# Patient Record
Sex: FEMALE | Race: WHITE | NOT HISPANIC OR LATINO | Employment: FULL TIME | URBAN - METROPOLITAN AREA
[De-identification: names, ages, dates, MRNs, and addresses within clinical notes are randomized per-mention and may not be internally consistent; named-entity substitution may affect disease eponyms.]

---

## 2017-04-12 ENCOUNTER — LAB VISIT (OUTPATIENT)
Dept: LAB | Facility: HOSPITAL | Age: 57
End: 2017-04-12
Attending: FAMILY MEDICINE
Payer: COMMERCIAL

## 2017-04-12 ENCOUNTER — OFFICE VISIT (OUTPATIENT)
Dept: FAMILY MEDICINE | Facility: CLINIC | Age: 57
End: 2017-04-12
Payer: COMMERCIAL

## 2017-04-12 VITALS
TEMPERATURE: 98 F | BODY MASS INDEX: 29.13 KG/M2 | HEART RATE: 88 BPM | DIASTOLIC BLOOD PRESSURE: 78 MMHG | WEIGHT: 185.63 LBS | SYSTOLIC BLOOD PRESSURE: 124 MMHG | HEIGHT: 67 IN

## 2017-04-12 DIAGNOSIS — E11.69 COMBINED HYPERLIPIDEMIA ASSOCIATED WITH TYPE 2 DIABETES MELLITUS: ICD-10-CM

## 2017-04-12 DIAGNOSIS — I15.2 HYPERTENSION ASSOCIATED WITH DIABETES: ICD-10-CM

## 2017-04-12 DIAGNOSIS — E11.59 HYPERTENSION ASSOCIATED WITH DIABETES: ICD-10-CM

## 2017-04-12 DIAGNOSIS — Z00.00 ANNUAL PHYSICAL EXAM: Primary | ICD-10-CM

## 2017-04-12 DIAGNOSIS — E78.2 COMBINED HYPERLIPIDEMIA ASSOCIATED WITH TYPE 2 DIABETES MELLITUS: ICD-10-CM

## 2017-04-12 DIAGNOSIS — E11.9 CONTROLLED TYPE 2 DIABETES MELLITUS WITHOUT COMPLICATION, WITHOUT LONG-TERM CURRENT USE OF INSULIN: ICD-10-CM

## 2017-04-12 LAB
ALBUMIN SERPL BCP-MCNC: 4.1 G/DL
ALP SERPL-CCNC: 93 U/L
ALT SERPL W/O P-5'-P-CCNC: 86 U/L
ANION GAP SERPL CALC-SCNC: 10 MMOL/L
AST SERPL-CCNC: 70 U/L
BILIRUB SERPL-MCNC: 0.3 MG/DL
BUN SERPL-MCNC: 17 MG/DL
CALCIUM SERPL-MCNC: 9.5 MG/DL
CHLORIDE SERPL-SCNC: 100 MMOL/L
CHOLEST/HDLC SERPL: 3.7 {RATIO}
CO2 SERPL-SCNC: 25 MMOL/L
CREAT SERPL-MCNC: 0.9 MG/DL
EST. GFR  (AFRICAN AMERICAN): >60 ML/MIN/1.73 M^2
EST. GFR  (NON AFRICAN AMERICAN): >60 ML/MIN/1.73 M^2
GLUCOSE SERPL-MCNC: 114 MG/DL
HDL/CHOLESTEROL RATIO: 27.2 %
HDLC SERPL-MCNC: 169 MG/DL
HDLC SERPL-MCNC: 46 MG/DL
LDLC SERPL CALC-MCNC: 91.8 MG/DL
NONHDLC SERPL-MCNC: 123 MG/DL
POTASSIUM SERPL-SCNC: 3.9 MMOL/L
PROT SERPL-MCNC: 8 G/DL
SODIUM SERPL-SCNC: 135 MMOL/L
TRIGL SERPL-MCNC: 156 MG/DL

## 2017-04-12 PROCEDURE — 36415 COLL VENOUS BLD VENIPUNCTURE: CPT | Mod: PO

## 2017-04-12 PROCEDURE — 99999 PR PBB SHADOW E&M-EST. PATIENT-LVL III: CPT | Mod: PBBFAC,,, | Performed by: FAMILY MEDICINE

## 2017-04-12 PROCEDURE — 99396 PREV VISIT EST AGE 40-64: CPT | Mod: S$GLB,,, | Performed by: FAMILY MEDICINE

## 2017-04-12 PROCEDURE — 80053 COMPREHEN METABOLIC PANEL: CPT

## 2017-04-12 PROCEDURE — 3078F DIAST BP <80 MM HG: CPT | Mod: S$GLB,,, | Performed by: FAMILY MEDICINE

## 2017-04-12 PROCEDURE — 3074F SYST BP LT 130 MM HG: CPT | Mod: S$GLB,,, | Performed by: FAMILY MEDICINE

## 2017-04-12 PROCEDURE — 83036 HEMOGLOBIN GLYCOSYLATED A1C: CPT

## 2017-04-12 PROCEDURE — 88175 CYTOPATH C/V AUTO FLUID REDO: CPT

## 2017-04-12 PROCEDURE — 80061 LIPID PANEL: CPT

## 2017-04-12 RX ORDER — BENAZEPRIL HYDROCHLORIDE AND HYDROCHLOROTHIAZIDE 10; 12.5 MG/1; MG/1
1 TABLET ORAL DAILY
Qty: 90 TABLET | Refills: 3 | Status: SHIPPED | OUTPATIENT
Start: 2017-04-12 | End: 2018-04-14 | Stop reason: SDUPTHER

## 2017-04-12 RX ORDER — METFORMIN HYDROCHLORIDE 500 MG/1
500 TABLET, EXTENDED RELEASE ORAL 2 TIMES DAILY
Qty: 180 TABLET | Refills: 3 | Status: SHIPPED | OUTPATIENT
Start: 2017-04-12 | End: 2017-05-04 | Stop reason: DRUGHIGH

## 2017-04-12 RX ORDER — FLUTICASONE PROPIONATE 50 MCG
2 SPRAY, SUSPENSION (ML) NASAL DAILY
Qty: 16 G | Refills: 3 | Status: SHIPPED | OUTPATIENT
Start: 2017-04-12 | End: 2018-05-22 | Stop reason: SDUPTHER

## 2017-04-12 RX ORDER — LOVASTATIN 20 MG/1
20 TABLET ORAL NIGHTLY
Qty: 90 TABLET | Refills: 3 | Status: SHIPPED | OUTPATIENT
Start: 2017-04-12 | End: 2017-08-16 | Stop reason: CLARIF

## 2017-04-12 NOTE — MR AVS SNAPSHOT
[facility]  [address] [facility]  [place] [address]  Phone: [phone]  Fax: [phone]                  Marlene Carrillo   2017 10:40 AM   Office Visit    Description:  Female : 1960   Provider:  Leonardo Pham MD   Department:  [facility]           Reason for Visit     Annual Exam     Plantar Fasciitis     leg cramps           Diagnoses this Visit        Comments    Annual physical exam    -  Primary     Hypertension associated with diabetes         Controlled type 2 diabetes mellitus without complication, without long-term current use of insulin         Combined hyperlipidemia associated with type 2 diabetes mellitus                To Do List           Future Appointments        Provider Department Dept Phone    2017 3:15 PM LABORATORY, [place] [facility] [phone]    2017 8:00 AM HMD[facility] DEXA1C [facility] [phone]      Goals (5 Years of Data)     None       These Medications        Disp Refills Start End    benazepril-hydrochlorthiazide (LOTENSIN HCT) 10-12.5 mg Tab 90 tablet 3 2017     Take 1 tablet by mouth once daily. - Oral    Pharmacy: [facility] - [place], [place] - [address] [facility] Ph #: [phone]       lovastatin (MEVACOR) 20 MG tablet 90 tablet 3 2017     Take 1 tablet (20 mg total) by mouth nightly. - Oral    Pharmacy: [facility] - [place], [place]  [address] [facility] Ph #: [phone]       metformin (GLUCOPHAGE-XR) 500 MG 24 hr tablet 180 tablet 3 2017     Take 1 tablet (500 mg total) by mouth 2 (two) times daily. - Oral    Pharmacy: [facility] - [place], [place]  [address] [facility] [address] Ph #: [phone]       fluticasone (FLONASE) 50 mcg/actuation nasal spray 16 g 3 2017     2 sprays by Each Nare route once daily. - Each Nare    Pharmacy: [facility] - [place], [place]  [address] [facility] [address] Ph #: [phone]         [facility]      [facility] Care [facility] - 24/7 Assistance  Unless otherwise directed by your provider, please contact [facility], our nurse care line that is available for 24/7 assistance.     Registered nurses in the [facility] provide: appointment scheduling, clinical advisement, health education, and other advisory services.  Call: [phone] (toll free)               Medications           Message regarding Medications     Verify the changes and/or additions to your medication regime listed below are the same as discussed with your clinician today.  If any of these changes or additions are incorrect, please notify your healthcare provider.        STOP taking these medications     albuterol 90 mcg/actuation inhaler Inhale 2 puffs into the lungs every 6 (six) hours as needed for Wheezing or Shortness of Breath.           Verify that the below list of medications is an accurate representation of the medications you are currently taking.  If none reported, the list may be blank. If incorrect, please contact your healthcare provider. Carry this list with you in case of emergency.           Current Medications     benazepril-hydrochlorthiazide (LOTENSIN HCT) 10-12.5 mg Tab Take 1 tablet by mouth once daily.    BLOOD SUGAR DIAGNOSTIC (BLOOD GLUCOSE TEST MISC) No Sig Provided    cetirizine (ZYRTEC) 10 MG tablet Take 1 tablet (10 mg total) by mouth once daily.    famotidine (PEPCID) 10 MG tablet Take 1 tablet (10 mg total) by mouth 2 (two) times daily as needed for Heartburn.    fish oil-omega-3 fatty acids 300-1,000 mg capsule Take 2 g by mouth 2 (two) times daily.     fluticasone (FLONASE) 50 mcg/actuation nasal spray 2 sprays by Each Nare route once daily.    lovastatin (MEVACOR) 20 MG tablet Take 1 tablet (20 mg total) by mouth nightly.    metformin (GLUCOPHAGE-XR) 500 MG 24 hr tablet Take 1 tablet (500 mg total) by mouth 2 (two) times daily.    multivitamin capsule Take 1 capsule by mouth once daily.    "          Clinical Reference Information           Your Vitals Were     BP Pulse Temp Height Weight BMI    124/78 88 98.3 °F (36.8 °C) 5' 7" (1.702 m) 84.2 kg (185 lb 10 oz) 29.07 kg/m2      Blood Pressure          Most Recent Value    BP  124/78      Allergies as of 4/12/2017     Corgard  [Nadolol]    Fiorinal  [Butalbital-aspirin-caffeine]    Percodan [Oxycodone Hcl-oxycodone-asa]      Immunizations Administered on Date of Encounter - 4/12/2017     None      Orders Placed During Today's Visit      Normal Orders This Visit    Liquid-based pap smear, screening     Microalbumin/creatinine urine ratio     Microalbumin/creatinine urine ratio     Microalbumin/creatinine urine ratio     Future Labs/Procedures Expected by Expires    Mammo Digital Screening Bilat with CAD  4/12/2017 4/12/2018    Recurring Lab Work Interval Expires    Comprehensive metabolic panel  Every 16 weeks until 12/20/2030 12/20/2030    Hemoglobin A1c  Every 16 weeks until 12/20/2030 12/20/2030    Lipid panel  Every 16 weeks until 12/20/2030 12/20/2030    Microalbumin/creatinine urine ratio  Every 16 weeks until 12/20/2030 12/20/2030      Language Assistance Services     ATTENTION: Language assistance services are available, free of charge. Please call [phone].      ATENCIÓN: Si habmorgan yu, tiene a sullivan disposición servicios gratuitos de asistencia lingüística. Llame al [phone].     CHÚ Ý: N?u b?n nói Ti?ng Vi?t, có các d?ch v? h? tr? ngôn ng? mi?n phí dành cho b?n. G?i s? [phone].         [facility] complies with applicable Federal civil rights laws and does not discriminate on the basis of race, color, national origin, age, disability, or sex.        "

## 2017-04-12 NOTE — PROGRESS NOTES
Patient presents physical exam.  She needs her mammogram and Pap smear.  Diabetes.  Needs hemoglobin A1c  Blood pressure controlled.  Hyperlipidemia needs laboratory.    Past Medical History:  Past Medical History:   Diagnosis Date    Abnormal Pap smear     ALLERGIC RHINITIS     Diabetes mellitus     Diverticulosis of colon (without mention of hemorrhage)     GERD (gastroesophageal reflux disease)     Heart murmur     Hyperlipidemia LDL goal < 100     Hypertension     Insomnia     Moderate cervical dysplasia      Past Surgical History:   Procedure Laterality Date    BREAST BIOPSY      Cold knife conization      COLONOSCOPY  8/9/2012         INCONTINENCE SURGERY      TVT-O    Tonsillectomy       Social History     Social History    Marital status:      Spouse name: N/A    Number of children: N/A    Years of education: N/A     Occupational History    Not on file.     Social History Main Topics    Smoking status: Former Smoker     Quit date: 3/8/1995    Smokeless tobacco: Never Used    Alcohol use No    Drug use: No    Sexual activity: No     Other Topics Concern    Not on file     Social History Narrative    No narrative on file     Family History   Problem Relation Age of Onset    Cancer Mother     Diabetes Mother     Hypertension Mother     Depression Brother     Diabetes Brother     Hyperlipidemia Brother     Hypertension Brother     Diabetes Maternal Grandmother     Stroke Maternal Grandmother     Heart disease Maternal Grandfather     Stroke Paternal Grandmother     Breast cancer Maternal Aunt     Ovarian cancer Neg Hx      Review of patient's allergies indicates:   Allergen Reactions    Corgard  [nadolol] Other (See Comments)    Fiorinal  [butalbital-aspirin-caffeine] Other (See Comments)    Percodan [oxycodone hcl-oxycodone-asa] Other (See Comments)     Current Outpatient Prescriptions on File Prior to Visit   Medication Sig Dispense Refill    BLOOD SUGAR  DIAGNOSTIC (BLOOD GLUCOSE TEST MISC) No Sig Provided      cetirizine (ZYRTEC) 10 MG tablet Take 1 tablet (10 mg total) by mouth once daily. 30 tablet 11    famotidine (PEPCID) 10 MG tablet Take 1 tablet (10 mg total) by mouth 2 (two) times daily as needed for Heartburn. 60 tablet 11    fish oil-omega-3 fatty acids 300-1,000 mg capsule Take 2 g by mouth 2 (two) times daily.       multivitamin capsule Take 1 capsule by mouth once daily.        [DISCONTINUED] benazepril-hydrochlorthiazide (LOTENSIN HCT) 10-12.5 mg Tab Take 1 tablet by mouth once daily. 90 tablet 3    [DISCONTINUED] fluticasone (FLONASE) 50 mcg/actuation nasal spray 2 sprays by Each Nare route once daily. 16 g 0    [DISCONTINUED] lovastatin (MEVACOR) 20 MG tablet Take 1 tablet (20 mg total) by mouth nightly. 90 tablet 3    [DISCONTINUED] metformin (GLUCOPHAGE-XR) 500 MG 24 hr tablet Take 1 tablet (500 mg total) by mouth 2 (two) times daily. 180 tablet 3    [DISCONTINUED] albuterol 90 mcg/actuation inhaler Inhale 2 puffs into the lungs every 6 (six) hours as needed for Wheezing or Shortness of Breath. 18 g 0     No current facility-administered medications on file prior to visit.            ROS:  GENERAL: No fever, chills,  or significant weight changes.  HEENT: No headache or hearing complaints.  No dysphagia  Eyes: No vision complaints  CHEST: Denies MENA, cyanosis, wheezing, cough and sputum production.  CARDIOVASCULAR: Denies chest pain, PND, orthopnea or reduced exercise tolerance.  ABDOMEN: Appetite fine. Denies diarrhea, abdominal pain, hematemesis or blood in stool.  URINARY: No flank pain, dysuria or hematuria.  MUSCULOSKELETAL: No warmth swelling or tenderness of the joints  NEUROLOGIC: No focal weakness numbness or paresthesia  PSYCHIATRIC: Denies depression          Past Medical History:  Past Medical History:   Diagnosis Date    Abnormal Pap smear     ALLERGIC RHINITIS     Diabetes mellitus     Diverticulosis of colon (without  mention of hemorrhage)     GERD (gastroesophageal reflux disease)     Heart murmur     Hyperlipidemia LDL goal < 100     Hypertension     Insomnia     Moderate cervical dysplasia      Past Surgical History:   Procedure Laterality Date    BREAST BIOPSY      Cold knife conization      COLONOSCOPY  8/9/2012         INCONTINENCE SURGERY      TVT-O    Tonsillectomy       Social History     Social History    Marital status:      Spouse name: N/A    Number of children: N/A    Years of education: N/A     Occupational History    Not on file.     Social History Main Topics    Smoking status: Former Smoker     Quit date: 3/8/1995    Smokeless tobacco: Never Used    Alcohol use No    Drug use: No    Sexual activity: No     Other Topics Concern    Not on file     Social History Narrative    No narrative on file     Family History   Problem Relation Age of Onset    Cancer Mother     Diabetes Mother     Hypertension Mother     Depression Brother     Diabetes Brother     Hyperlipidemia Brother     Hypertension Brother     Diabetes Maternal Grandmother     Stroke Maternal Grandmother     Heart disease Maternal Grandfather     Stroke Paternal Grandmother     Breast cancer Maternal Aunt     Ovarian cancer Neg Hx      Review of patient's allergies indicates:   Allergen Reactions    Corgard  [nadolol] Other (See Comments)    Fiorinal  [butalbital-aspirin-caffeine] Other (See Comments)    Percodan [oxycodone hcl-oxycodone-asa] Other (See Comments)     Current Outpatient Prescriptions on File Prior to Visit   Medication Sig Dispense Refill    BLOOD SUGAR DIAGNOSTIC (BLOOD GLUCOSE TEST MISC) No Sig Provided      cetirizine (ZYRTEC) 10 MG tablet Take 1 tablet (10 mg total) by mouth once daily. 30 tablet 11    famotidine (PEPCID) 10 MG tablet Take 1 tablet (10 mg total) by mouth 2 (two) times daily as needed for Heartburn. 60 tablet 11    fish oil-omega-3 fatty acids 300-1,000 mg capsule Take  "2 g by mouth 2 (two) times daily.       multivitamin capsule Take 1 capsule by mouth once daily.        [DISCONTINUED] benazepril-hydrochlorthiazide (LOTENSIN HCT) 10-12.5 mg Tab Take 1 tablet by mouth once daily. 90 tablet 3    [DISCONTINUED] fluticasone (FLONASE) 50 mcg/actuation nasal spray 2 sprays by Each Nare route once daily. 16 g 0    [DISCONTINUED] lovastatin (MEVACOR) 20 MG tablet Take 1 tablet (20 mg total) by mouth nightly. 90 tablet 3    [DISCONTINUED] metformin (GLUCOPHAGE-XR) 500 MG 24 hr tablet Take 1 tablet (500 mg total) by mouth 2 (two) times daily. 180 tablet 3    [DISCONTINUED] albuterol 90 mcg/actuation inhaler Inhale 2 puffs into the lungs every 6 (six) hours as needed for Wheezing or Shortness of Breath. 18 g 0     No current facility-administered medications on file prior to visit.          ROS:  GENERAL: No fever, chills,  or significant weight changes.  HEENT: No headache, vision or hearing complaints.  No dysphagia  CHEST: Denies MENA, cyanosis, wheezing, cough and sputum production.  CARDIOVASCULAR: Denies chest pain, PND, orthopnea or reduced exercise tolerance.  ABDOMEN: Appetite fine. Denies diarrhea, abdominal pain, hematemesis or blood in stool.  URINARY: No flank pain, dysuria or hematuria.  MUSCULOSKELETAL: No warmth swelling or tenderness of the joints  NEUROLOGIC: No focal weakness numbness or paresthesia  PSYCHIATRIC: Denies depression    OBJECTIVE:   Vitals:    04/12/17 1052   BP: 124/78   Pulse: 88   Temp: 98.3 °F (36.8 °C)   Weight: 84.2 kg (185 lb 10 oz)   Height: 5' 7" (1.702 m)       Wt Readings from Last 3 Encounters:   04/12/17 84.2 kg (185 lb 10 oz)   05/19/16 81.7 kg (180 lb 1.9 oz)   01/08/16 78.8 kg (173 lb 11.6 oz)         APPEARANCE: Well nourished, well developed, in no acute distress.   HEAD: Normocephalic. Atraumatic. No sinus tenderness.   EYES:   Right eye: Pupil reactive. Conjunctiva clear.   Left eye: Pupil reactive. Conjunctiva clear.   Both fundi: " Grossly normal to nondilated exam. EOMI.   EARS: TM's intact. Light reflex normal. No retraction or perforation.   NOSE: clear.   MOUTH & THROAT: No pharyngeal erythema or exudate. No lesions.   NECK: No bruits. No JVD. No cervical lymphadenopathy. No thyromegaly.   CHEST: Breath sounds clear bilaterally. Normal respiratory effort   CARDIOVASCULAR: Normal rate. Regular rhythm. No murmurs. No rub. No gallops.   ABDOMEN: Bowel sounds normal. Soft. No tenderness. No organomegaly.   PERIPHERAL VASCULAR: No cyanosis. No clubbing. No edema.   NEUROLOGIC: No focal findings.   MENTAL STATUS: Alert. Oriented x 3.  Breast exam no masses or adenopathy  pelvic exam: normal external genitalia. Cervix is normal. Uterus not enlarged. No palpable adnexa. No lesions. No discharge.  Pap smear performed  Protective Sensation (w/ 10 gram monofilament):  Right: Intact  Left: Intact    Visual Inspection:  Normal -  Bilateral    Pedal Pulses:   Right: Present  Left: Present    Posterior tibialis:   Right:Present  Left: Present              Marlene was seen today for annual exam, plantar fasciitis and leg cramps.    Diagnoses and all orders for this visit:    Annual physical exam  -     Mammo Digital Screening Bilat with CAD; Future  -     Liquid-based pap smear, screening    Hypertension associated with diabetes  -     Comprehensive metabolic panel; Standing  -     Hemoglobin A1c; Standing  -     Microalbumin/creatinine urine ratio; Standing  -     Microalbumin/creatinine urine ratio  -     Microalbumin/creatinine urine ratio  -     Microalbumin/creatinine urine ratio    Controlled type 2 diabetes mellitus without complication, without long-term current use of insulin    Combined hyperlipidemia associated with type 2 diabetes mellitus  -     Lipid panel; Standing    Other orders  -     benazepril-hydrochlorthiazide (LOTENSIN HCT) 10-12.5 mg Tab; Take 1 tablet by mouth once daily.  -     lovastatin (MEVACOR) 20 MG tablet; Take 1 tablet (20  mg total) by mouth nightly.  -     metformin (GLUCOPHAGE-XR) 500 MG 24 hr tablet; Take 1 tablet (500 mg total) by mouth 2 (two) times daily.  -     fluticasone (FLONASE) 50 mcg/actuation nasal spray; 2 sprays by Each Nare route once daily.    Anticipatory guidance: Don't smoke.  Healthy diet and regular exercise recommended.

## 2017-04-13 ENCOUNTER — HOSPITAL ENCOUNTER (OUTPATIENT)
Dept: RADIOLOGY | Facility: HOSPITAL | Age: 57
Discharge: HOME | End: 2017-04-13
Attending: FAMILY MEDICINE
Payer: COMMERCIAL

## 2017-04-13 DIAGNOSIS — Z00.00 ANNUAL PHYSICAL EXAM: ICD-10-CM

## 2017-04-13 LAB
ESTIMATED AVG GLUCOSE: 171 MG/DL
HBA1C MFR BLD HPLC: 7.6 %

## 2017-04-13 PROCEDURE — 77067 SCR MAMMO BI INCL CAD: CPT | Mod: 26,,, | Performed by: RADIOLOGY

## 2017-04-13 PROCEDURE — 77067 SCR MAMMO BI INCL CAD: CPT | Mod: TC

## 2017-05-04 RX ORDER — METFORMIN HYDROCHLORIDE 500 MG/1
TABLET, EXTENDED RELEASE ORAL
Qty: 270 TABLET | Refills: 3 | Status: SHIPPED | OUTPATIENT
Start: 2017-05-04 | End: 2018-04-14 | Stop reason: SDUPTHER

## 2017-05-04 NOTE — TELEPHONE ENCOUNTER
----- Message from Leonardo Pham MD sent at 5/4/2017  1:26 PM CDT -----  Sugar borderline, otherwise lab work acceptable.  Recommend increase metformin  mg take 3 tablets daily.  Repeat hemoglobin A1c in 3 months. My nurse will contact you to arrange.  Thanks,  Dr. Pham    Results released on [facility]

## 2017-08-03 ENCOUNTER — LAB VISIT (OUTPATIENT)
Dept: LAB | Facility: HOSPITAL | Age: 57
End: 2017-08-03
Attending: FAMILY MEDICINE
Payer: COMMERCIAL

## 2017-08-03 DIAGNOSIS — E11.59 HYPERTENSION ASSOCIATED WITH DIABETES: ICD-10-CM

## 2017-08-03 DIAGNOSIS — I15.2 HYPERTENSION ASSOCIATED WITH DIABETES: ICD-10-CM

## 2017-08-03 DIAGNOSIS — E11.69 COMBINED HYPERLIPIDEMIA ASSOCIATED WITH TYPE 2 DIABETES MELLITUS: ICD-10-CM

## 2017-08-03 DIAGNOSIS — E78.2 COMBINED HYPERLIPIDEMIA ASSOCIATED WITH TYPE 2 DIABETES MELLITUS: ICD-10-CM

## 2017-08-03 LAB
ALBUMIN SERPL BCP-MCNC: 4 G/DL
ALP SERPL-CCNC: 79 U/L
ALT SERPL W/O P-5'-P-CCNC: 116 U/L
ANION GAP SERPL CALC-SCNC: 14 MMOL/L
AST SERPL-CCNC: 69 U/L
BILIRUB SERPL-MCNC: 0.3 MG/DL
BUN SERPL-MCNC: 17 MG/DL
CALCIUM SERPL-MCNC: 9.5 MG/DL
CHLORIDE SERPL-SCNC: 105 MMOL/L
CHOLEST/HDLC SERPL: 3.3 {RATIO}
CO2 SERPL-SCNC: 22 MMOL/L
CREAT SERPL-MCNC: 0.9 MG/DL
EST. GFR  (AFRICAN AMERICAN): >60 ML/MIN/1.73 M^2
EST. GFR  (NON AFRICAN AMERICAN): >60 ML/MIN/1.73 M^2
GLUCOSE SERPL-MCNC: 142 MG/DL
HDL/CHOLESTEROL RATIO: 30.1 %
HDLC SERPL-MCNC: 153 MG/DL
HDLC SERPL-MCNC: 46 MG/DL
LDLC SERPL CALC-MCNC: 83.6 MG/DL
NONHDLC SERPL-MCNC: 107 MG/DL
POTASSIUM SERPL-SCNC: 4.4 MMOL/L
PROT SERPL-MCNC: 7.6 G/DL
SODIUM SERPL-SCNC: 141 MMOL/L
TRIGL SERPL-MCNC: 117 MG/DL

## 2017-08-03 PROCEDURE — 36415 COLL VENOUS BLD VENIPUNCTURE: CPT | Mod: PO

## 2017-08-03 PROCEDURE — 80061 LIPID PANEL: CPT

## 2017-08-03 PROCEDURE — 83036 HEMOGLOBIN GLYCOSYLATED A1C: CPT

## 2017-08-03 PROCEDURE — 80053 COMPREHEN METABOLIC PANEL: CPT

## 2017-08-04 LAB
ESTIMATED AVG GLUCOSE: 151 MG/DL
HBA1C MFR BLD HPLC: 6.9 %

## 2017-08-16 DIAGNOSIS — E78.5 HYPERLIPIDEMIA, UNSPECIFIED HYPERLIPIDEMIA TYPE: Primary | ICD-10-CM

## 2017-08-16 RX ORDER — PRAVASTATIN SODIUM 20 MG/1
20 TABLET ORAL DAILY
Qty: 90 TABLET | Refills: 3 | Status: SHIPPED | OUTPATIENT
Start: 2017-08-16 | End: 2017-11-21

## 2017-08-16 NOTE — TELEPHONE ENCOUNTER
----- Message from Pretty Cherry sent at 8/16/2017 10:59 AM CDT -----  Pt was calling the nurse back...please call pt at [phone].

## 2017-08-16 NOTE — TELEPHONE ENCOUNTER
----- Message from Leonardo Pham MD sent at 8/16/2017 10:48 AM CDT -----  Sugar test okay.  Lipids okay.  Liver test slightly increased from previously.  Recommend discontinue lovastatin.  Start pravastatin 20 mg daily.  Repeat lipid panel, ALT 3 months.  Repeat hemoglobin A1c at the end of January. My nurse will contact you to arrange.  Thanks,  Dr. Pham    Results released on [facility]

## 2017-11-03 ENCOUNTER — LAB VISIT (OUTPATIENT)
Dept: LAB | Facility: HOSPITAL | Age: 57
End: 2017-11-03
Attending: FAMILY MEDICINE
Payer: COMMERCIAL

## 2017-11-03 DIAGNOSIS — E78.5 HYPERLIPIDEMIA, UNSPECIFIED HYPERLIPIDEMIA TYPE: ICD-10-CM

## 2017-11-03 DIAGNOSIS — E11.69 COMBINED HYPERLIPIDEMIA ASSOCIATED WITH TYPE 2 DIABETES MELLITUS: ICD-10-CM

## 2017-11-03 DIAGNOSIS — E78.2 COMBINED HYPERLIPIDEMIA ASSOCIATED WITH TYPE 2 DIABETES MELLITUS: ICD-10-CM

## 2017-11-03 LAB
ALT SERPL W/O P-5'-P-CCNC: 111 U/L
CHOLEST SERPL-MCNC: 162 MG/DL
CHOLEST/HDLC SERPL: 3.7 {RATIO}
HDLC SERPL-MCNC: 44 MG/DL
HDLC SERPL: 27.2 %
LDLC SERPL CALC-MCNC: 97.2 MG/DL
NONHDLC SERPL-MCNC: 118 MG/DL
TRIGL SERPL-MCNC: 104 MG/DL

## 2017-11-03 PROCEDURE — 80061 LIPID PANEL: CPT

## 2017-11-03 PROCEDURE — 36415 COLL VENOUS BLD VENIPUNCTURE: CPT | Mod: PO

## 2017-11-03 PROCEDURE — 84460 ALANINE AMINO (ALT) (SGPT): CPT

## 2017-11-21 ENCOUNTER — TELEPHONE (OUTPATIENT)
Dept: FAMILY MEDICINE | Facility: CLINIC | Age: 57
End: 2017-11-21

## 2017-11-21 DIAGNOSIS — R79.89 ELEVATED LIVER FUNCTION TESTS: Primary | ICD-10-CM

## 2017-11-21 NOTE — TELEPHONE ENCOUNTER
Notes Recorded by Leonardo Pham MD on 11/20/2017 at 8:30 AM CST  The cholesterol looks good, however the liver test is still elevated.  Recommend stop the pravastatin.  Repeat LFT with next scheduled laboratory in January. My nurse will contact you to arrange.  Thanks,  Dr. Pham

## 2018-01-24 ENCOUNTER — LAB VISIT (OUTPATIENT)
Dept: LAB | Facility: HOSPITAL | Age: 58
End: 2018-01-24
Attending: FAMILY MEDICINE
Payer: COMMERCIAL

## 2018-01-24 DIAGNOSIS — E11.59 HYPERTENSION ASSOCIATED WITH DIABETES: ICD-10-CM

## 2018-01-24 DIAGNOSIS — R79.89 ELEVATED LIVER FUNCTION TESTS: ICD-10-CM

## 2018-01-24 DIAGNOSIS — I15.2 HYPERTENSION ASSOCIATED WITH DIABETES: ICD-10-CM

## 2018-01-24 LAB
ALBUMIN SERPL BCP-MCNC: 3.7 G/DL
ALP SERPL-CCNC: 74 U/L
ALT SERPL W/O P-5'-P-CCNC: 83 U/L
AST SERPL-CCNC: 43 U/L
BILIRUB DIRECT SERPL-MCNC: 0.1 MG/DL
BILIRUB SERPL-MCNC: 0.3 MG/DL
ESTIMATED AVG GLUCOSE: 140 MG/DL
HBA1C MFR BLD HPLC: 6.5 %
PROT SERPL-MCNC: 7 G/DL

## 2018-01-24 PROCEDURE — 80076 HEPATIC FUNCTION PANEL: CPT

## 2018-01-24 PROCEDURE — 83036 HEMOGLOBIN GLYCOSYLATED A1C: CPT

## 2018-01-24 PROCEDURE — 36415 COLL VENOUS BLD VENIPUNCTURE: CPT | Mod: PO

## 2018-02-06 PROBLEM — Z78.9 STATIN INTOLERANCE: Status: ACTIVE | Noted: 2018-02-06

## 2018-04-12 ENCOUNTER — TELEPHONE (OUTPATIENT)
Dept: FAMILY MEDICINE | Facility: CLINIC | Age: 58
End: 2018-04-12

## 2018-04-12 DIAGNOSIS — Z12.39 SCREENING FOR BREAST CANCER: Primary | ICD-10-CM

## 2018-04-12 NOTE — TELEPHONE ENCOUNTER
----- Message from Cinthia Wild sent at 4/12/2018 11:49 AM CDT -----  Contact: pt  She's calling to schedule mammogram, please add orders and advise [phone]

## 2018-04-12 NOTE — TELEPHONE ENCOUNTER
----- Message from Cinthia Wild sent at 4/12/2018 11:49 AM CDT -----  Contact: pt  She's calling to schedule mammogram, please add orders and advise [phone]

## 2018-04-15 RX ORDER — BENAZEPRIL HYDROCHLORIDE AND HYDROCHLOROTHIAZIDE 10; 12.5 MG/1; MG/1
TABLET ORAL
Qty: 120 TABLET | Refills: 0 | Status: SHIPPED | OUTPATIENT
Start: 2018-04-15 | End: 2018-05-22 | Stop reason: SDUPTHER

## 2018-04-15 RX ORDER — METFORMIN HYDROCHLORIDE 500 MG/1
TABLET, EXTENDED RELEASE ORAL
Qty: 360 TABLET | Refills: 0 | Status: SHIPPED | OUTPATIENT
Start: 2018-04-15 | End: 2018-05-22 | Stop reason: SDUPTHER

## 2018-04-26 ENCOUNTER — HOSPITAL ENCOUNTER (OUTPATIENT)
Dept: RADIOLOGY | Facility: HOSPITAL | Age: 58
Discharge: HOME | End: 2018-04-26
Attending: FAMILY MEDICINE
Payer: COMMERCIAL

## 2018-04-26 VITALS — HEIGHT: 67 IN | WEIGHT: 185.63 LBS | BODY MASS INDEX: 29.13 KG/M2

## 2018-04-26 DIAGNOSIS — Z12.39 SCREENING FOR BREAST CANCER: ICD-10-CM

## 2018-04-26 PROCEDURE — 77063 BREAST TOMOSYNTHESIS BI: CPT | Mod: 26,,, | Performed by: RADIOLOGY

## 2018-04-26 PROCEDURE — 77067 SCR MAMMO BI INCL CAD: CPT | Mod: TC,PO

## 2018-04-26 PROCEDURE — 77067 SCR MAMMO BI INCL CAD: CPT | Mod: 26,,, | Performed by: RADIOLOGY

## 2018-05-04 ENCOUNTER — TELEPHONE (OUTPATIENT)
Dept: RADIOLOGY | Facility: HOSPITAL | Age: 58
End: 2018-05-04

## 2018-05-04 ENCOUNTER — HOSPITAL ENCOUNTER (OUTPATIENT)
Dept: RADIOLOGY | Facility: HOSPITAL | Age: 58
Discharge: HOME | End: 2018-05-04
Attending: FAMILY MEDICINE
Payer: COMMERCIAL

## 2018-05-04 DIAGNOSIS — R92.8 ABNORMAL MAMMOGRAM: ICD-10-CM

## 2018-05-04 PROCEDURE — 77065 DX MAMMO INCL CAD UNI: CPT | Mod: TC,PO,LT

## 2018-05-04 PROCEDURE — 77065 DX MAMMO INCL CAD UNI: CPT | Mod: 26,LT,, | Performed by: RADIOLOGY

## 2018-05-08 ENCOUNTER — PATIENT OUTREACH (OUTPATIENT)
Dept: ADMINISTRATIVE | Facility: HOSPITAL | Age: 58
End: 2018-05-08

## 2018-05-08 NOTE — LETTER
May 15, 2018    Marlene Carrillo  [address] [place] [address]           [facility]  [address]  [facility] [address]  Phone: [phone] Dear Mrs. Carrillo:    [facility] is committed to your overall health.  To help you get the most out of each of your visits, we will review your information to make sure you are up to date on all of your recommended tests and/or procedures.      Leonardo Pham MD has found that you may be due for   Health Maintenance Due   Topic    TETANUS VACCINE     Eye Exam     Foot Exam       If you have had any of the above done at another facility, please bring the records or information with you so that your record at [facility] will be complete.    If you are currently taking medication, please bring it with you to your appointment for review.    We will be happy to assist you with scheduling any necessary appointments or you may contact the [facility] appointment desk at [phone] to schedule at your convenience.   This information was sent to you via your patient portal.  Please check your message portal for important information.      Thank you for choosing [facility] for your healthcare needs,    If you have any questions or concerns, please don't hesitate to call.    Sincerely,    NIURKA Navarrete  Care Coordination Department  [facility]  [phone]

## 2018-05-22 ENCOUNTER — OFFICE VISIT (OUTPATIENT)
Dept: FAMILY MEDICINE | Facility: CLINIC | Age: 58
End: 2018-05-22
Payer: COMMERCIAL

## 2018-05-22 VITALS
HEIGHT: 66 IN | HEART RATE: 90 BPM | SYSTOLIC BLOOD PRESSURE: 129 MMHG | TEMPERATURE: 98 F | BODY MASS INDEX: 30.22 KG/M2 | WEIGHT: 188 LBS | DIASTOLIC BLOOD PRESSURE: 76 MMHG

## 2018-05-22 DIAGNOSIS — I15.2 HYPERTENSION ASSOCIATED WITH DIABETES: ICD-10-CM

## 2018-05-22 DIAGNOSIS — E11.9 DIABETES MELLITUS WITHOUT COMPLICATION: ICD-10-CM

## 2018-05-22 DIAGNOSIS — Z00.00 ROUTINE HISTORY AND PHYSICAL EXAMINATION OF ADULT: Primary | ICD-10-CM

## 2018-05-22 DIAGNOSIS — K21.9 GASTROESOPHAGEAL REFLUX DISEASE, ESOPHAGITIS PRESENCE NOT SPECIFIED: ICD-10-CM

## 2018-05-22 DIAGNOSIS — E78.2 COMBINED HYPERLIPIDEMIA ASSOCIATED WITH TYPE 2 DIABETES MELLITUS: ICD-10-CM

## 2018-05-22 DIAGNOSIS — Z78.9 STATIN INTOLERANCE: ICD-10-CM

## 2018-05-22 DIAGNOSIS — E11.69 COMBINED HYPERLIPIDEMIA ASSOCIATED WITH TYPE 2 DIABETES MELLITUS: ICD-10-CM

## 2018-05-22 DIAGNOSIS — J30.9 ALLERGIC RHINITIS, UNSPECIFIED SEASONALITY, UNSPECIFIED TRIGGER: ICD-10-CM

## 2018-05-22 DIAGNOSIS — E11.59 HYPERTENSION ASSOCIATED WITH DIABETES: ICD-10-CM

## 2018-05-22 PROCEDURE — 3044F HG A1C LEVEL LT 7.0%: CPT | Mod: CPTII,S$GLB,, | Performed by: FAMILY MEDICINE

## 2018-05-22 PROCEDURE — 90715 TDAP VACCINE 7 YRS/> IM: CPT | Mod: S$GLB,,, | Performed by: FAMILY MEDICINE

## 2018-05-22 PROCEDURE — 99999 PR PBB SHADOW E&M-EST. PATIENT-LVL III: CPT | Mod: PBBFAC,,, | Performed by: FAMILY MEDICINE

## 2018-05-22 PROCEDURE — 3078F DIAST BP <80 MM HG: CPT | Mod: CPTII,S$GLB,, | Performed by: FAMILY MEDICINE

## 2018-05-22 PROCEDURE — 90471 IMMUNIZATION ADMIN: CPT | Mod: S$GLB,,, | Performed by: FAMILY MEDICINE

## 2018-05-22 PROCEDURE — 3074F SYST BP LT 130 MM HG: CPT | Mod: CPTII,S$GLB,, | Performed by: FAMILY MEDICINE

## 2018-05-22 PROCEDURE — 99396 PREV VISIT EST AGE 40-64: CPT | Mod: 25,S$GLB,, | Performed by: FAMILY MEDICINE

## 2018-05-22 RX ORDER — FLUTICASONE PROPIONATE 50 MCG
2 SPRAY, SUSPENSION (ML) NASAL DAILY
Qty: 48 G | Refills: 3 | Status: SHIPPED | OUTPATIENT
Start: 2018-05-22

## 2018-05-22 RX ORDER — ASPIRIN 81 MG/1
81 TABLET ORAL DAILY
COMMUNITY
Start: 2018-05-22 | End: 2023-05-02

## 2018-05-22 RX ORDER — GLUCOSAMINE HCL 500 MG
1 TABLET ORAL DAILY
COMMUNITY

## 2018-05-22 RX ORDER — ASCORBIC ACID 1000 MG
250 TABLET ORAL DAILY
COMMUNITY
End: 2023-05-02

## 2018-05-22 RX ORDER — OMEPRAZOLE 20 MG/1
20 CAPSULE, DELAYED RELEASE ORAL EVERY OTHER DAY
COMMUNITY
End: 2020-12-17

## 2018-05-22 RX ORDER — METFORMIN HYDROCHLORIDE 500 MG/1
1500 TABLET, EXTENDED RELEASE ORAL EVERY MORNING
Qty: 270 TABLET | Refills: 3 | Status: SHIPPED | OUTPATIENT
Start: 2018-05-22 | End: 2018-07-27 | Stop reason: DRUGHIGH

## 2018-05-22 RX ORDER — BENAZEPRIL HYDROCHLORIDE AND HYDROCHLOROTHIAZIDE 10; 12.5 MG/1; MG/1
1 TABLET ORAL DAILY
Qty: 90 TABLET | Refills: 3 | Status: SHIPPED | OUTPATIENT
Start: 2018-05-22 | End: 2019-05-21

## 2018-05-22 NOTE — PROGRESS NOTES
Patient presents physical exam follow-up diabetes hypertension hyperlipidemia.  Statin intolerant.  Reflux well controlled using omeprazole every other day.  Eye exam up-to-date.  Mammogram up-to-date and was abnormal.  She is pending a breast biopsy.    Diabetes Management Status    Statin: Not taking  ACE/ARB: Taking    Screening or Prevention Patient's value Goal Complete/Controlled?   HgA1C Testing and Control   Lab Results   Component Value Date    HGBA1C 6.5 (H) 01/24/2018      Annually/Less than 8% Yes   Lipid profile : 11/03/2017 Annually Yes   LDL control Lab Results   Component Value Date    LDLCALC 97.2 11/03/2017    Annually/Less than 100 mg/dl  Yes   Nephropathy screening Lab Results   Component Value Date    LABMICR 19.0 08/03/2017     Lab Results   Component Value Date    PROTEINUA Negative 03/08/2012    Annually Yes   Blood pressure BP Readings from Last 1 Encounters:   05/22/18 129/76    Less than 140/90 Yes   Dilated retinal exam : 04/26/2018 Annually Yes   Foot exam   : 05/22/2018 Annually Yes         Past Medical History:  Past Medical History:   Diagnosis Date    Abnormal Pap smear     ALLERGIC RHINITIS     Diabetes mellitus     Diverticulosis of colon (without mention of hemorrhage)     GERD (gastroesophageal reflux disease)     Heart murmur     Hyperlipidemia LDL goal < 100     Hypertension     Insomnia     Moderate cervical dysplasia      Past Surgical History:   Procedure Laterality Date    BREAST BIOPSY      Cold knife conization      COLONOSCOPY  8/9/2012         INCONTINENCE SURGERY      TVT-O    Tonsillectomy       Social History     Social History    Marital status:      Spouse name: N/A    Number of children: N/A    Years of education: N/A     Occupational History    Not on file.     Social History Main Topics    Smoking status: Former Smoker     Quit date: 3/8/1995    Smokeless tobacco: Never Used    Alcohol use No    Drug use: No    Sexual activity: No      Other Topics Concern    Not on file     Social History Narrative    No narrative on file     Family History   Problem Relation Age of Onset    Cancer Mother     Diabetes Mother     Hypertension Mother     Depression Brother     Diabetes Brother     Hyperlipidemia Brother     Hypertension Brother     Diabetes Maternal Grandmother     Stroke Maternal Grandmother     Heart disease Maternal Grandfather     Stroke Paternal Grandmother     Breast cancer Maternal Aunt     Ovarian cancer Neg Hx      Review of patient's allergies indicates:   Allergen Reactions    Corgard  [nadolol] Other (See Comments)    Fiorinal  [butalbital-aspirin-caffeine] Other (See Comments)    Percodan [oxycodone hcl-oxycodone-asa] Other (See Comments)    Pravastatin Other (See Comments)     Elevated liver enzymes     Current Outpatient Prescriptions on File Prior to Visit   Medication Sig Dispense Refill    BLOOD SUGAR DIAGNOSTIC (BLOOD GLUCOSE TEST MISC) No Sig Provided      cetirizine (ZYRTEC) 10 MG tablet Take 1 tablet (10 mg total) by mouth once daily. 30 tablet 11    multivitamin capsule Take 1 capsule by mouth once daily.        [DISCONTINUED] benazepril-hydrochlorthiazide (LOTENSIN HCT) 10-12.5 mg Tab TAKE ONE TABLET BY MOUTH EVERY  tablet 0    [DISCONTINUED] fluticasone (FLONASE) 50 mcg/actuation nasal spray 2 sprays by Each Nare route once daily. 16 g 3    [DISCONTINUED] metFORMIN (GLUCOPHAGE-XR) 500 MG 24 hr tablet TAKE THREE TABLETS BY MOUTH EVERY MORNING 360 tablet 0    [DISCONTINUED] famotidine (PEPCID) 10 MG tablet Take 1 tablet (10 mg total) by mouth 2 (two) times daily as needed for Heartburn. 60 tablet 11    [DISCONTINUED] fish oil-omega-3 fatty acids 300-1,000 mg capsule Take 2 g by mouth 2 (two) times daily.        No current facility-administered medications on file prior to visit.      Answers for HPI/ROS submitted by the patient on 5/20/2018   activity change: No  unexpected weight change:  "No  neck pain: No  hearing loss: No  rhinorrhea: No  trouble swallowing: No  eye discharge: No  visual disturbance: No  chest tightness: No  wheezing: No  chest pain: No  palpitations: No  blood in stool: No  constipation: No  vomiting: No  diarrhea: No  polydipsia: No  polyuria: No  difficulty urinating: No  hematuria: No  menstrual problem: No  dysuria: No  joint swelling: No  arthralgias: No  headaches: No  weakness: No  confusion: No  dysphoric mood: No      OBJECTIVE:   Vitals:    05/22/18 0843   BP: 129/76   Pulse: 90   Temp: 98 °F (36.7 °C)   Weight: 85.3 kg (188 lb)   Height: 5' 6.2" (1.681 m)     Wt Readings from Last 3 Encounters:   05/22/18 85.3 kg (188 lb)   04/26/18 84.2 kg (185 lb 10 oz)   04/12/17 84.2 kg (185 lb 10 oz)       APPEARANCE: Well nourished, well developed, in no acute distress.   HEAD: Normocephalic. Atraumatic. No sinus tenderness.   EYES:   Right eye: Pupil reactive. Conjunctiva clear.   Left eye: Pupil reactive. Conjunctiva clear.   Both fundi: Grossly normal to nondilated exam. EOMI.   EARS: TM's intact. Light reflex normal. No retraction or perforation.   NOSE: clear.   MOUTH & THROAT: No pharyngeal erythema or exudate. No lesions.   NECK: No bruits. No JVD. No cervical lymphadenopathy. No thyromegaly.   CHEST: Breath sounds clear bilaterally. Normal respiratory effort   CARDIOVASCULAR: Normal rate. Regular rhythm. No murmurs. No rub. No gallops.   ABDOMEN: Bowel sounds normal. Soft. No tenderness. No organomegaly.   PERIPHERAL VASCULAR: No cyanosis. No clubbing. No edema.   NEUROLOGIC: No focal findings.    Feet:Sensation in the feet intact to monofilament testing.   No significant foot lesions.Pulses palpable.  MENTAL STATUS: Alert. Oriented x 3.     Marlene was seen today for annual exam.    Diagnoses and all orders for this visit:    Routine history and physical examination of adult    Diabetes mellitus without complication    Hypertension associated with diabetes    Combined " hyperlipidemia associated with type 2 diabetes mellitus    Statin intolerance    Allergic rhinitis, unspecified seasonality, unspecified trigger    Gastroesophageal reflux disease, esophagitis presence not specified    Other orders  -     Cancel: Diabetic Eye Screening Photo; Future  -     Tdap Vaccine; Future  -     fluticasone (FLONASE) 50 mcg/actuation nasal spray; 2 sprays (100 mcg total) by Each Nare route once daily.  -     benazepril-hydrochlorthiazide (LOTENSIN HCT) 10-12.5 mg Tab; Take 1 tablet by mouth once daily.  -     metFORMIN (GLUCOPHAGE-XR) 500 MG 24 hr tablet; Take 3 tablets (1,500 mg total) by mouth every morning.  -     Tdap Vaccine  -     aspirin (ECOTRIN) 81 MG EC tablet; Take 1 tablet (81 mg total) by mouth once daily.     Add aspirin daily starting after her breast biopsy.  Continue other medication as currently.    Anticipatory guidance: Don't smoke.  Healthy diet and regular exercise recommended.

## 2018-05-31 ENCOUNTER — HOSPITAL ENCOUNTER (OUTPATIENT)
Dept: RADIOLOGY | Facility: HOSPITAL | Age: 58
Discharge: HOME | End: 2018-05-31
Attending: FAMILY MEDICINE
Payer: COMMERCIAL

## 2018-05-31 DIAGNOSIS — R92.8 ABNORMAL MAMMOGRAM OF LEFT BREAST: ICD-10-CM

## 2018-05-31 PROCEDURE — 27201044 MAMMO BREAST STEREOTACTIC BREAST BIOPSY LEFT: Mod: PO

## 2018-05-31 PROCEDURE — 19081 BX BREAST 1ST LESION STRTCTC: CPT | Mod: TC,PO,LT

## 2018-05-31 PROCEDURE — 88305 TISSUE EXAM BY PATHOLOGIST: CPT | Mod: 26,,, | Performed by: PATHOLOGY

## 2018-05-31 PROCEDURE — 88305 TISSUE EXAM BY PATHOLOGIST: CPT | Performed by: PATHOLOGY

## 2018-05-31 PROCEDURE — 19081 BX BREAST 1ST LESION STRTCTC: CPT | Mod: LT,,, | Performed by: RADIOLOGY

## 2018-06-04 ENCOUNTER — TELEPHONE (OUTPATIENT)
Dept: RADIOLOGY | Facility: HOSPITAL | Age: 58
End: 2018-06-04

## 2018-06-04 NOTE — TELEPHONE ENCOUNTER
..Patient notified of breast biopsy results.    FINAL PATHOLOGIC DIAGNOSIS  CORE BIOPSIES OF UPPER CENTRAL LEFT BREAST:  DENSE STROMAL SCLEROSIS WITH DYSTROPHIC CALCIFICATION IN SPECIMEN 1  NO ATYPICAL DUCTAL PROLIFERATIVE ACTIVITY IDENTIFIED     Instructed to repeat diagnostic mammogram in 6  months

## 2018-07-11 ENCOUNTER — LAB VISIT (OUTPATIENT)
Dept: LAB | Facility: HOSPITAL | Age: 58
End: 2018-07-11
Attending: FAMILY MEDICINE
Payer: COMMERCIAL

## 2018-07-11 DIAGNOSIS — E78.2 COMBINED HYPERLIPIDEMIA ASSOCIATED WITH TYPE 2 DIABETES MELLITUS: ICD-10-CM

## 2018-07-11 DIAGNOSIS — E11.69 COMBINED HYPERLIPIDEMIA ASSOCIATED WITH TYPE 2 DIABETES MELLITUS: ICD-10-CM

## 2018-07-11 DIAGNOSIS — I15.2 HYPERTENSION ASSOCIATED WITH DIABETES: ICD-10-CM

## 2018-07-11 DIAGNOSIS — E11.59 HYPERTENSION ASSOCIATED WITH DIABETES: ICD-10-CM

## 2018-07-11 LAB
ALBUMIN SERPL BCP-MCNC: 3.8 G/DL
ALP SERPL-CCNC: 80 U/L
ALT SERPL W/O P-5'-P-CCNC: 72 U/L
ANION GAP SERPL CALC-SCNC: 10 MMOL/L
AST SERPL-CCNC: 39 U/L
BILIRUB SERPL-MCNC: 0.2 MG/DL
BUN SERPL-MCNC: 17 MG/DL
CALCIUM SERPL-MCNC: 9.3 MG/DL
CHLORIDE SERPL-SCNC: 105 MMOL/L
CHOLEST SERPL-MCNC: 207 MG/DL
CHOLEST/HDLC SERPL: 4.9 {RATIO}
CO2 SERPL-SCNC: 25 MMOL/L
CREAT SERPL-MCNC: 0.8 MG/DL
EST. GFR  (AFRICAN AMERICAN): >60 ML/MIN/1.73 M^2
EST. GFR  (NON AFRICAN AMERICAN): >60 ML/MIN/1.73 M^2
ESTIMATED AVG GLUCOSE: 157 MG/DL
GLUCOSE SERPL-MCNC: 127 MG/DL
HBA1C MFR BLD HPLC: 7.1 %
HDLC SERPL-MCNC: 42 MG/DL
HDLC SERPL: 20.3 %
LDLC SERPL CALC-MCNC: 124.4 MG/DL
NONHDLC SERPL-MCNC: 165 MG/DL
POTASSIUM SERPL-SCNC: 3.9 MMOL/L
PROT SERPL-MCNC: 7.1 G/DL
SODIUM SERPL-SCNC: 140 MMOL/L
TRIGL SERPL-MCNC: 203 MG/DL

## 2018-07-11 PROCEDURE — 36415 COLL VENOUS BLD VENIPUNCTURE: CPT | Mod: PO

## 2018-07-11 PROCEDURE — 83036 HEMOGLOBIN GLYCOSYLATED A1C: CPT

## 2018-07-11 PROCEDURE — 80061 LIPID PANEL: CPT

## 2018-07-11 PROCEDURE — 80053 COMPREHEN METABOLIC PANEL: CPT

## 2018-07-27 DIAGNOSIS — E11.9 TYPE 2 DIABETES MELLITUS WITHOUT COMPLICATION, WITHOUT LONG-TERM CURRENT USE OF INSULIN: Primary | ICD-10-CM

## 2018-07-27 RX ORDER — METFORMIN HYDROCHLORIDE 500 MG/1
2000 TABLET, EXTENDED RELEASE ORAL DAILY
Qty: 360 TABLET | Refills: 1 | Status: SHIPPED | OUTPATIENT
Start: 2018-07-27 | End: 2019-01-30

## 2018-07-27 RX ORDER — METFORMIN HYDROCHLORIDE 500 MG/1
500 TABLET, EXTENDED RELEASE ORAL 4 TIMES DAILY
COMMUNITY
End: 2018-07-27 | Stop reason: SDUPTHER

## 2018-07-27 NOTE — TELEPHONE ENCOUNTER
Message   Received: Today   Message Contents   MD JENIFER Breen. Staff             Sugar and cholesterol borderline.  Liver tests improving.  Recommend increase metformin  mg take 4 tablets every morning.  Continue other medication as currently.  Recheck hemoglobin A1c, LFT  beginning of January. My nurse will contact you to arrange.   Thanks,   Dr. Pham

## 2018-12-03 ENCOUNTER — HOSPITAL ENCOUNTER (OUTPATIENT)
Dept: RADIOLOGY | Facility: HOSPITAL | Age: 58
Discharge: HOME | End: 2018-12-03
Attending: FAMILY MEDICINE
Payer: COMMERCIAL

## 2018-12-03 VITALS — BODY MASS INDEX: 30.22 KG/M2 | HEIGHT: 66 IN | WEIGHT: 188 LBS

## 2018-12-03 DIAGNOSIS — R92.8 ABNORMAL MAMMOGRAM OF LEFT BREAST: ICD-10-CM

## 2018-12-03 DIAGNOSIS — R92.8 ABNORMAL MAMMOGRAM: ICD-10-CM

## 2018-12-03 PROCEDURE — 77065 DX MAMMO INCL CAD UNI: CPT | Mod: TC,PO,LT

## 2018-12-03 PROCEDURE — G0279 TOMOSYNTHESIS, MAMMO: HCPCS | Mod: 26,,, | Performed by: RADIOLOGY

## 2018-12-03 PROCEDURE — 77065 DX MAMMO INCL CAD UNI: CPT | Mod: 26,LT,, | Performed by: RADIOLOGY

## 2018-12-03 PROCEDURE — 77061 BREAST TOMOSYNTHESIS UNI: CPT | Mod: TC,PO,LT

## 2019-01-07 ENCOUNTER — LAB VISIT (OUTPATIENT)
Dept: LAB | Facility: HOSPITAL | Age: 59
End: 2019-01-07
Attending: FAMILY MEDICINE
Payer: COMMERCIAL

## 2019-01-07 DIAGNOSIS — E11.9 TYPE 2 DIABETES MELLITUS WITHOUT COMPLICATION, WITHOUT LONG-TERM CURRENT USE OF INSULIN: ICD-10-CM

## 2019-01-07 DIAGNOSIS — E11.59 HYPERTENSION ASSOCIATED WITH DIABETES: ICD-10-CM

## 2019-01-07 DIAGNOSIS — I15.2 HYPERTENSION ASSOCIATED WITH DIABETES: ICD-10-CM

## 2019-01-07 LAB
ALBUMIN SERPL BCP-MCNC: 4.1 G/DL
ALP SERPL-CCNC: 84 U/L
ALT SERPL W/O P-5'-P-CCNC: 84 U/L
AST SERPL-CCNC: 45 U/L
BILIRUB DIRECT SERPL-MCNC: 0.2 MG/DL
BILIRUB SERPL-MCNC: 0.3 MG/DL
ESTIMATED AVG GLUCOSE: 146 MG/DL
HBA1C MFR BLD HPLC: 6.7 %
PROT SERPL-MCNC: 7.7 G/DL

## 2019-01-07 PROCEDURE — 83036 HEMOGLOBIN GLYCOSYLATED A1C: CPT

## 2019-01-07 PROCEDURE — 80076 HEPATIC FUNCTION PANEL: CPT

## 2019-01-07 PROCEDURE — 36415 COLL VENOUS BLD VENIPUNCTURE: CPT | Mod: PO

## 2019-01-18 ENCOUNTER — TELEPHONE (OUTPATIENT)
Dept: FAMILY MEDICINE | Facility: CLINIC | Age: 59
End: 2019-01-18

## 2019-01-18 DIAGNOSIS — R79.89 ELEVATED LIVER FUNCTION TESTS: Primary | ICD-10-CM

## 2019-01-18 NOTE — TELEPHONE ENCOUNTER
MD JENIFER Breen Staff             Sugar looks good.  Liver tests still elevated.  Recommend get ultrasound abdomen, check iron, TIBC, ferritin, acute hepatitis panel now.  Schedule lipid panel, CMP, hemoglobin A1c,  urine microalbumin in beginning of July. My nurse will contact you to arrange.   Thanks,   Dr. Pham

## 2019-01-28 ENCOUNTER — HOSPITAL ENCOUNTER (OUTPATIENT)
Dept: RADIOLOGY | Facility: HOSPITAL | Age: 59
Discharge: HOME | End: 2019-01-28
Attending: FAMILY MEDICINE
Payer: COMMERCIAL

## 2019-01-28 ENCOUNTER — OFFICE VISIT (OUTPATIENT)
Dept: FAMILY MEDICINE | Facility: CLINIC | Age: 59
End: 2019-01-28
Payer: COMMERCIAL

## 2019-01-28 VITALS
WEIGHT: 181 LBS | BODY MASS INDEX: 28.41 KG/M2 | OXYGEN SATURATION: 96 % | SYSTOLIC BLOOD PRESSURE: 126 MMHG | DIASTOLIC BLOOD PRESSURE: 77 MMHG | HEART RATE: 117 BPM | TEMPERATURE: 98 F | HEIGHT: 67 IN

## 2019-01-28 DIAGNOSIS — R05.9 COUGH: ICD-10-CM

## 2019-01-28 DIAGNOSIS — R05.9 COUGH: Primary | ICD-10-CM

## 2019-01-28 PROCEDURE — 3078F DIAST BP <80 MM HG: CPT | Mod: CPTII,S$GLB,, | Performed by: FAMILY MEDICINE

## 2019-01-28 PROCEDURE — 99999 PR PBB SHADOW E&M-EST. PATIENT-LVL III: CPT | Mod: PBBFAC,,, | Performed by: FAMILY MEDICINE

## 2019-01-28 PROCEDURE — 71046 XR CHEST PA AND LATERAL: ICD-10-PCS | Mod: 26,,, | Performed by: RADIOLOGY

## 2019-01-28 PROCEDURE — 71046 X-RAY EXAM CHEST 2 VIEWS: CPT | Mod: TC,PO

## 2019-01-28 PROCEDURE — 71046 X-RAY EXAM CHEST 2 VIEWS: CPT | Mod: 26,,, | Performed by: RADIOLOGY

## 2019-01-28 PROCEDURE — 3078F PR MOST RECENT DIASTOLIC BLOOD PRESSURE < 80 MM HG: ICD-10-PCS | Mod: CPTII,S$GLB,, | Performed by: FAMILY MEDICINE

## 2019-01-28 PROCEDURE — 99213 OFFICE O/P EST LOW 20 MIN: CPT | Mod: S$GLB,,, | Performed by: FAMILY MEDICINE

## 2019-01-28 PROCEDURE — 99213 PR OFFICE/OUTPT VISIT, EST, LEVL III, 20-29 MIN: ICD-10-PCS | Mod: S$GLB,,, | Performed by: FAMILY MEDICINE

## 2019-01-28 PROCEDURE — 99999 PR PBB SHADOW E&M-EST. PATIENT-LVL III: ICD-10-PCS | Mod: PBBFAC,,, | Performed by: FAMILY MEDICINE

## 2019-01-28 PROCEDURE — 3008F BODY MASS INDEX DOCD: CPT | Mod: CPTII,S$GLB,, | Performed by: FAMILY MEDICINE

## 2019-01-28 PROCEDURE — 3008F PR BODY MASS INDEX (BMI) DOCUMENTED: ICD-10-PCS | Mod: CPTII,S$GLB,, | Performed by: FAMILY MEDICINE

## 2019-01-28 PROCEDURE — 3074F PR MOST RECENT SYSTOLIC BLOOD PRESSURE < 130 MM HG: ICD-10-PCS | Mod: CPTII,S$GLB,, | Performed by: FAMILY MEDICINE

## 2019-01-28 PROCEDURE — 3074F SYST BP LT 130 MM HG: CPT | Mod: CPTII,S$GLB,, | Performed by: FAMILY MEDICINE

## 2019-01-28 RX ORDER — CODEINE PHOSPHATE AND GUAIFENESIN 10; 100 MG/5ML; MG/5ML
SOLUTION ORAL
Qty: 118 ML | Refills: 0 | Status: SHIPPED | OUTPATIENT
Start: 2019-01-28 | End: 2019-05-21

## 2019-01-28 NOTE — LETTER
January 28, 2019      [facility]  [address] [address]  Phone: [phone]  Fax: [phone]       Patient: Marlene Carrillo   YOB: 1960  Date of Visit: 01/28/2019    To Whom It May Concern:    Leeann Carrillo  was at [facility] on 1/23/19, 1/25/19,1/28/19,1/29/19, 1/30/19, 1/31/19 She may return to work/school on 2/1/19 with no restrictions. If you have any questions or concerns, or if I can be of further assistance, please do not hesitate to contact me.    Sincerely,    Lucinda Cardenas MA

## 2019-01-29 NOTE — PROGRESS NOTES
Patient complains of sore throat, congestion, postnasal drainage,  cough, no fever.  Felt winded.  Symptoms started over the past several days.  Current treatment over-the-counter medication.    Past Medical History:  Past Medical History:   Diagnosis Date    Abnormal Pap smear     ALLERGIC RHINITIS     Diabetes mellitus     Diverticulosis of colon (without mention of hemorrhage)     GERD (gastroesophageal reflux disease)     Heart murmur     Hyperlipidemia LDL goal < 100     Hypertension     Insomnia     Moderate cervical dysplasia      Past Surgical History:   Procedure Laterality Date    BREAST BIOPSY      Cold knife conization      COLONOSCOPY  8/9/2012         INCONTINENCE SURGERY      TVT-O    Tonsillectomy       Review of patient's allergies indicates:   Allergen Reactions    Corgard  [nadolol] Other (See Comments)    Fiorinal  [butalbital-aspirin-caffeine] Other (See Comments)    Percodan [oxycodone hcl-oxycodone-asa] Other (See Comments)    Pravastatin Other (See Comments)     Elevated liver enzymes     Current Outpatient Medications on File Prior to Visit   Medication Sig Dispense Refill    A/b-carotene/C/biofl/B6/mn/Ech (ECHINACEA C COMPLETE ORAL) Take 450 mg by mouth 2 (two) times daily.      aspirin (ECOTRIN) 81 MG EC tablet Take 1 tablet (81 mg total) by mouth once daily.      benazepril-hydrochlorthiazide (LOTENSIN HCT) 10-12.5 mg Tab Take 1 tablet by mouth once daily. 90 tablet 3    BLOOD SUGAR DIAGNOSTIC (BLOOD GLUCOSE TEST MISC) No Sig Provided      cetirizine (ZYRTEC) 10 MG tablet Take 1 tablet (10 mg total) by mouth once daily. 30 tablet 11    fluticasone (FLONASE) 50 mcg/actuation nasal spray Instill 2 sprays (100 mcg total) in each nostril once daily. 48 g 3    metFORMIN (GLUCOPHAGE-XR) 500 MG 24 hr tablet Take 4 tablets (2,000 mg total) by mouth once daily. 360 tablet 1    milk thistle 175 mg tablet Take 250 mg by mouth once daily.      multivitamin capsule Take 1  "capsule by mouth once daily.        omeprazole (PRILOSEC) 20 MG capsule Take 20 mg by mouth every other day.      RED YEAST RICE ORAL Take by mouth once daily.      ubidecarenone (COENZYME Q10) 100 mg Tab Take 1 tablet by mouth once daily.       No current facility-administered medications on file prior to visit.      Social History     Socioeconomic History    Marital status:      Spouse name: Not on file    Number of children: Not on file    Years of education: Not on file    Highest education level: Not on file   Social Needs    Financial resource strain: Not on file    Food insecurity - worry: Not on file    Food insecurity - inability: Not on file    Transportation needs - medical: Not on file    Transportation needs - non-medical: Not on file   Occupational History    Not on file   Tobacco Use    Smoking status: Former Smoker     Last attempt to quit: 3/8/1995     Years since quittin.9    Smokeless tobacco: Never Used   Substance and Sexual Activity    Alcohol use: No    Drug use: No    Sexual activity: No   Other Topics Concern    Not on file   Social History Narrative    Not on file     Family History   Problem Relation Age of Onset    Cancer Mother     Diabetes Mother     Hypertension Mother     Depression Brother     Diabetes Brother     Hyperlipidemia Brother     Hypertension Brother     Diabetes Maternal Grandmother     Stroke Maternal Grandmother     Heart disease Maternal Grandfather     Stroke Paternal Grandmother     Breast cancer Maternal Aunt     Ovarian cancer Neg Hx              Physical Exam:  Vitals:    19 1450   BP: 126/77   Pulse: (!) 117   Temp: 98.2 °F (36.8 °C)   SpO2: 96%   Weight: 82.1 kg (181 lb)   Height: 5' 7" (1.702 m)       Gen.: No acute distress  HEENT: sinuses nontender. Ears: Tympanic membranes intact.  No erythema or effusion.  Nose mild congestion.  Throat mild erythema no exudate.  Mild postnasal drainage.  Neck supple no " adenopathy  Chest: Clear to auscultation.  Normal respiratory effort.    Chest x-ray negative  Diagnoses and all orders for this visit:    Cough  -     X-Ray Chest PA And Lateral; Future    Other orders  -     guaifenesin-codeine 100-10 mg/5 ml (TUSSI-ORGANIDIN NR)  mg/5 mL syrup; Take 5-10 ml po every 6 hours as needed for cough          Follow-up as needed if symptoms not improving with Recommended treatment next few days

## 2019-01-30 RX ORDER — METFORMIN HYDROCHLORIDE 500 MG/1
2000 TABLET, EXTENDED RELEASE ORAL DAILY
Qty: 360 TABLET | Refills: 3 | Status: SHIPPED | OUTPATIENT
Start: 2019-01-30 | End: 2020-01-18 | Stop reason: SDUPTHER

## 2019-02-04 ENCOUNTER — HOSPITAL ENCOUNTER (OUTPATIENT)
Dept: RADIOLOGY | Facility: HOSPITAL | Age: 59
Discharge: HOME | End: 2019-02-04
Attending: FAMILY MEDICINE
Payer: COMMERCIAL

## 2019-02-04 DIAGNOSIS — R79.89 ELEVATED LIVER FUNCTION TESTS: ICD-10-CM

## 2019-02-04 PROCEDURE — 76700 US EXAM ABDOM COMPLETE: CPT | Mod: TC,PO

## 2019-02-04 PROCEDURE — 76700 US EXAM ABDOM COMPLETE: CPT | Mod: 26,,, | Performed by: RADIOLOGY

## 2019-02-04 PROCEDURE — 76700 US ABDOMEN COMPLETE: ICD-10-PCS | Mod: 26,,, | Performed by: RADIOLOGY

## 2019-04-29 ENCOUNTER — TELEPHONE (OUTPATIENT)
Dept: FAMILY MEDICINE | Facility: CLINIC | Age: 59
End: 2019-04-29

## 2019-04-29 DIAGNOSIS — Z12.39 SCREENING FOR BREAST CANCER: Primary | ICD-10-CM

## 2019-04-29 NOTE — TELEPHONE ENCOUNTER
They recommended that she go back to yearly screening mammograms after the diagnostic mammogram which was done in December on the left side only.  At this point I would recommend that she resume yearly bilateral screening mammograms with the next mammogram to be done at the beginning of June

## 2019-04-29 NOTE — TELEPHONE ENCOUNTER
Patient informed mammogram due in December, she was under the impression she needed a 6 month follow up as she had a biopsy, please advise as I do not see this.

## 2019-04-29 NOTE — TELEPHONE ENCOUNTER
----- Message from Ilene Liriano sent at 4/29/2019 10:49 AM CDT -----  Contact: Pt   Pt is calling .Type:  Mammogram  Pt  is requesting to have orders put in to  schedule annual mammogram appointment.  Order is not listed in EPIC.  Please enter order and contact patient to schedule.  Name of Caller:Pt   Where would they like the mammogram performed? [facility]   Would the patient rather a call back or a response via [facility]? Call Back   Best Call Back Number:[phone]          .Thank You  Tamera Liriano

## 2019-05-21 ENCOUNTER — TELEPHONE (OUTPATIENT)
Dept: FAMILY MEDICINE | Facility: CLINIC | Age: 59
End: 2019-05-21

## 2019-05-21 ENCOUNTER — PATIENT MESSAGE (OUTPATIENT)
Dept: FAMILY MEDICINE | Facility: CLINIC | Age: 59
End: 2019-05-21

## 2019-05-21 ENCOUNTER — OFFICE VISIT (OUTPATIENT)
Dept: FAMILY MEDICINE | Facility: CLINIC | Age: 59
End: 2019-05-21
Payer: COMMERCIAL

## 2019-05-21 VITALS
BODY MASS INDEX: 29.51 KG/M2 | HEIGHT: 67 IN | SYSTOLIC BLOOD PRESSURE: 114 MMHG | TEMPERATURE: 99 F | WEIGHT: 188 LBS | HEART RATE: 99 BPM | DIASTOLIC BLOOD PRESSURE: 68 MMHG

## 2019-05-21 DIAGNOSIS — I15.2 HYPERTENSION ASSOCIATED WITH DIABETES: Primary | ICD-10-CM

## 2019-05-21 DIAGNOSIS — E11.9 TYPE 2 DIABETES MELLITUS WITHOUT COMPLICATION, WITHOUT LONG-TERM CURRENT USE OF INSULIN: ICD-10-CM

## 2019-05-21 DIAGNOSIS — J32.9 SINUSITIS, UNSPECIFIED CHRONICITY, UNSPECIFIED LOCATION: ICD-10-CM

## 2019-05-21 DIAGNOSIS — E11.59 HYPERTENSION ASSOCIATED WITH DIABETES: Primary | ICD-10-CM

## 2019-05-21 PROCEDURE — 3078F PR MOST RECENT DIASTOLIC BLOOD PRESSURE < 80 MM HG: ICD-10-PCS | Mod: CPTII,S$GLB,, | Performed by: FAMILY MEDICINE

## 2019-05-21 PROCEDURE — 99999 PR PBB SHADOW E&M-EST. PATIENT-LVL III: ICD-10-PCS | Mod: PBBFAC,,, | Performed by: FAMILY MEDICINE

## 2019-05-21 PROCEDURE — 3074F PR MOST RECENT SYSTOLIC BLOOD PRESSURE < 130 MM HG: ICD-10-PCS | Mod: CPTII,S$GLB,, | Performed by: FAMILY MEDICINE

## 2019-05-21 PROCEDURE — 3008F PR BODY MASS INDEX (BMI) DOCUMENTED: ICD-10-PCS | Mod: CPTII,S$GLB,, | Performed by: FAMILY MEDICINE

## 2019-05-21 PROCEDURE — 3008F BODY MASS INDEX DOCD: CPT | Mod: CPTII,S$GLB,, | Performed by: FAMILY MEDICINE

## 2019-05-21 PROCEDURE — 99214 OFFICE O/P EST MOD 30 MIN: CPT | Mod: S$GLB,,, | Performed by: FAMILY MEDICINE

## 2019-05-21 PROCEDURE — 3044F PR MOST RECENT HEMOGLOBIN A1C LEVEL <7.0%: ICD-10-PCS | Mod: CPTII,S$GLB,, | Performed by: FAMILY MEDICINE

## 2019-05-21 PROCEDURE — 3074F SYST BP LT 130 MM HG: CPT | Mod: CPTII,S$GLB,, | Performed by: FAMILY MEDICINE

## 2019-05-21 PROCEDURE — 99999 PR PBB SHADOW E&M-EST. PATIENT-LVL III: CPT | Mod: PBBFAC,,, | Performed by: FAMILY MEDICINE

## 2019-05-21 PROCEDURE — 3078F DIAST BP <80 MM HG: CPT | Mod: CPTII,S$GLB,, | Performed by: FAMILY MEDICINE

## 2019-05-21 PROCEDURE — 99214 PR OFFICE/OUTPT VISIT, EST, LEVL IV, 30-39 MIN: ICD-10-PCS | Mod: S$GLB,,, | Performed by: FAMILY MEDICINE

## 2019-05-21 PROCEDURE — 3044F HG A1C LEVEL LT 7.0%: CPT | Mod: CPTII,S$GLB,, | Performed by: FAMILY MEDICINE

## 2019-05-21 RX ORDER — AMOXICILLIN AND CLAVULANATE POTASSIUM 875; 125 MG/1; MG/1
1 TABLET, FILM COATED ORAL 2 TIMES DAILY
Qty: 20 TABLET | Refills: 0 | Status: SHIPPED | OUTPATIENT
Start: 2019-05-21 | End: 2019-05-31

## 2019-05-21 RX ORDER — CODEINE PHOSPHATE AND GUAIFENESIN 10; 100 MG/5ML; MG/5ML
SOLUTION ORAL
Qty: 118 ML | Refills: 0 | Status: SHIPPED | OUTPATIENT
Start: 2019-05-21 | End: 2019-05-23 | Stop reason: SDUPTHER

## 2019-05-21 NOTE — TELEPHONE ENCOUNTER
Left voicemail for patient to call and let us know or send message to dr. Pham and let us know where to send Rx.

## 2019-05-21 NOTE — PROGRESS NOTES
Patient complains of sinus symptoms past several days with some sinus pressure cough congestion.  She has had a small amount of blood noted when she blows her nose several times.  No fever.  Also follow-up on diabetes currently controlled.  She has follow-up laboratory scheduled.  She did stop taking her blood pressure medication when she ran out and her blood pressure remained stable.  No prior proteinuria.  States recent eye exam May 9th.    Diabetes Management Status    Statin: Not taking  ACE/ARB: Taking    Screening or Prevention Patient's value Goal Complete/Controlled?   HgA1C Testing and Control   Lab Results   Component Value Date    HGBA1C 6.7 (H) 01/07/2019      Annually/Less than 8% Yes   Lipid profile : 07/11/2018 Annually Yes   LDL control Lab Results   Component Value Date    LDLCALC 124.4 07/11/2018    Annually/Less than 100 mg/dl  No   Nephropathy screening Lab Results   Component Value Date    LABMICR 13.0 07/11/2018     Lab Results   Component Value Date    PROTEINUA Negative 03/08/2012    Annually Yes   Blood pressure BP Readings from Last 1 Encounters:   05/21/19 114/68    Less than 140/90 Yes   Dilated retinal exam : 05/09/2019 Annually Yes   Foot exam   : 05/21/2019 Annually Yes       Past Medical History:  Past Medical History:   Diagnosis Date    Abnormal Pap smear     ALLERGIC RHINITIS     Diabetes mellitus     Diverticulosis of colon (without mention of hemorrhage)     GERD (gastroesophageal reflux disease)     Heart murmur     Hyperlipidemia LDL goal < 100     Hypertension     Insomnia     Moderate cervical dysplasia      Past Surgical History:   Procedure Laterality Date    BREAST BIOPSY      Cold knife conization      COLONOSCOPY  8/9/2012         INCONTINENCE SURGERY      TVT-O    Tonsillectomy       Social History     Socioeconomic History    Marital status:      Spouse name: Not on file    Number of children: Not on file    Years of education: Not on file     Highest education level: Not on file   Occupational History    Not on file   Social Needs    Financial resource strain: Not on file    Food insecurity:     Worry: Not on file     Inability: Not on file    Transportation needs:     Medical: Not on file     Non-medical: Not on file   Tobacco Use    Smoking status: Former Smoker     Last attempt to quit: 3/8/1995     Years since quittin.2    Smokeless tobacco: Never Used   Substance and Sexual Activity    Alcohol use: No    Drug use: No    Sexual activity: Never   Lifestyle    Physical activity:     Days per week: Not on file     Minutes per session: Not on file    Stress: Not on file   Relationships    Social connections:     Talks on phone: Not on file     Gets together: Not on file     Attends Rastafari service: Not on file     Active member of club or organization: Not on file     Attends meetings of clubs or organizations: Not on file     Relationship status: Not on file   Other Topics Concern    Not on file   Social History Narrative    Not on file     Family History   Problem Relation Age of Onset    Cancer Mother     Diabetes Mother     Hypertension Mother     Depression Brother     Skin cancer Brother     HIV Brother     Diabetes Maternal Grandmother     Stroke Maternal Grandmother     Heart disease Maternal Grandfather     Stroke Paternal Grandmother     Breast cancer Maternal Aunt     Lymphoma Brother     Diabetes Brother     Hypertension Brother     Hyperlipidemia Brother     Ovarian cancer Neg Hx      Review of patient's allergies indicates:   Allergen Reactions    Corgard  [nadolol] Other (See Comments)    Fiorinal  [butalbital-aspirin-caffeine] Other (See Comments)    Percodan [oxycodone hcl-oxycodone-asa] Other (See Comments)    Pravastatin Other (See Comments)     Elevated liver enzymes     Current Outpatient Medications on File Prior to Visit   Medication Sig Dispense Refill    A/b-carotene/C/biofl/B6/mn/Ech  "(ECHINACEA C COMPLETE ORAL) Take 450 mg by mouth 2 (two) times daily.      aspirin (ECOTRIN) 81 MG EC tablet Take 1 tablet (81 mg total) by mouth once daily.      BLOOD SUGAR DIAGNOSTIC (BLOOD GLUCOSE TEST MISC) No Sig Provided      cetirizine (ZYRTEC) 10 MG tablet Take 1 tablet (10 mg total) by mouth once daily. 30 tablet 11    fluticasone (FLONASE) 50 mcg/actuation nasal spray Instill 2 sprays (100 mcg total) in each nostril once daily. 48 g 3    metFORMIN (GLUCOPHAGE-XR) 500 MG 24 hr tablet Take 4 tablets (2,000 mg total) by mouth once daily. 360 tablet 3    milk thistle 175 mg tablet Take 250 mg by mouth once daily.      multivitamin capsule Take 1 capsule by mouth once daily.        omeprazole (PRILOSEC) 20 MG capsule Take 20 mg by mouth every other day.      RED YEAST RICE ORAL Take by mouth once daily.      ubidecarenone (COENZYME Q10) 100 mg Tab Take 1 tablet by mouth once daily.      [DISCONTINUED] benazepril-hydrochlorthiazide (LOTENSIN HCT) 10-12.5 mg Tab Take 1 tablet by mouth once daily. 90 tablet 3    [DISCONTINUED] guaifenesin-codeine 100-10 mg/5 ml (TUSSI-ORGANIDIN NR)  mg/5 mL syrup Take 5-10 ml po every 6 hours as needed for cough 118 mL 0     No current facility-administered medications on file prior to visit.          ROS:  GENERAL: No fever, chills,  or significant weight changes.   CARDIOVASCULAR: Denies chest pain, PND, orthopnea or reduced exercise tolerance.  ABDOMEN: Appetite fine. Denies diarrhea, abdominal pain, hematemesis or blood in stool.  URINARY: No flank pain, dysuria or hematuria.        OBJECTIVE:     Vitals:    05/21/19 1122   BP: 114/68   Pulse: 99   Temp: 98.6 °F (37 °C)   Weight: 85.3 kg (188 lb)   Height: 5' 7" (1.702 m)     Wt Readings from Last 3 Encounters:   05/21/19 85.3 kg (188 lb)   01/28/19 82.1 kg (181 lb)   12/03/18 85.3 kg (188 lb)     APPEARANCE: Well nourished, well developed, in no acute distress.    HEAD: Normocephalic.  Atraumatic.  Mild " maxillary sinus tenderness.  EYES:   Right eye: Pupil reactive.  Conjunctiva clear.    Left eye: Pupil reactive.  Conjunctiva clear.    EOMI.    EARS: TM's intact. Light reflex normal. No retraction or perforation.    NOSE:  Mild congestion.  No masses or epistaxis  MOUTH & THROAT:  No pharyngeal erythema or exudate. No lesions.  NECK: Supple. No bruits.  No JVD.  No cervical lymphadenopathy.  No thyromegaly.    CHEST: Breath sounds clear bilaterally.  Normal respiratory effort  CARDIOVASCULAR: Normal rate.  Regular rhythm.  No murmurs.  No rub.  No gallops.  PERIPHERAL VASCULAR: No cyanosis.  No clubbing.  No edema.  NEUROLOGIC: No focal findings.  MENTAL STATUS: Alert.  Oriented x 3.  Protective Sensation (w/ 10 gram monofilament):  Right: Intact  Left: Intact    Visual Inspection:  Mild callus otherwise unremarkable    Pedal Pulses:   Right: Present  Left: Present    Posterior tibialis:   Right:Present  Left: Present        Marlene was seen today for sinus problem, headache and cough.    Diagnoses and all orders for this visit:    Hypertension associated with diabetes    Type 2 diabetes mellitus without complication, without long-term current use of insulin    Sinusitis, unspecified chronicity, unspecified location    Other orders  -     amoxicillin-clavulanate 875-125mg (AUGMENTIN) 875-125 mg per tablet; Take 1 tablet by mouth 2 (two) times daily. for 10 days  -     guaifenesin-codeine 100-10 mg/5 ml (TUSSI-ORGANIDIN NR)  mg/5 mL syrup; Take 5-10 ml po every 6 hours as needed for cough     She will remain off the benazepril hydrochlorothiazide for now and monitor blood pressure.  She does have follow-up laboratory scheduled in July to include urine microalbumin.  Follow-up regarding sinus symptoms if not resolving with above.

## 2019-05-21 NOTE — TELEPHONE ENCOUNTER
----- Message from Primo Cazares sent at 5/21/2019  1:57 PM CDT -----  Contact: [facility]  Type:  Pharmacy Calling to Clarify an RX    Name of Caller:derik  [facility]  Prescription Name: [email or web address]  What do they need to clarify?:n/a  Best Call Back Number:[phone]  Additional Information: rx is not in stock at pharmacy and wants to know if pt can get another rx for something else or rx needs to be e-scribed to another pharmacy.    Thanks,  Primo Cazares

## 2019-05-22 ENCOUNTER — PATIENT MESSAGE (OUTPATIENT)
Dept: FAMILY MEDICINE | Facility: CLINIC | Age: 59
End: 2019-05-22

## 2019-05-23 RX ORDER — CODEINE PHOSPHATE AND GUAIFENESIN 10; 100 MG/5ML; MG/5ML
SOLUTION ORAL
Qty: 118 ML | Refills: 0 | Status: SHIPPED | OUTPATIENT
Start: 2019-05-23 | End: 2020-03-17 | Stop reason: SDUPTHER

## 2019-05-23 RX ORDER — FLUTICASONE PROPIONATE 50 MCG
2 SPRAY, SUSPENSION (ML) NASAL DAILY
Qty: 48 G | Refills: 3 | OUTPATIENT
Start: 2019-05-23

## 2019-07-17 ENCOUNTER — HOSPITAL ENCOUNTER (OUTPATIENT)
Dept: RADIOLOGY | Facility: HOSPITAL | Age: 59
Discharge: HOME | End: 2019-07-17
Attending: FAMILY MEDICINE
Payer: COMMERCIAL

## 2019-07-17 VITALS — BODY MASS INDEX: 29.51 KG/M2 | WEIGHT: 188 LBS | HEIGHT: 67 IN

## 2019-07-17 DIAGNOSIS — Z12.39 SCREENING FOR BREAST CANCER: ICD-10-CM

## 2019-07-17 PROCEDURE — 77067 SCR MAMMO BI INCL CAD: CPT | Mod: 26,,, | Performed by: RADIOLOGY

## 2019-07-17 PROCEDURE — 77063 MAMMO DIGITAL SCREENING BILAT WITH TOMOSYNTHESIS_CAD: ICD-10-PCS | Mod: 26,,, | Performed by: RADIOLOGY

## 2019-07-17 PROCEDURE — 77067 SCR MAMMO BI INCL CAD: CPT | Mod: TC,PO

## 2019-07-17 PROCEDURE — 77063 BREAST TOMOSYNTHESIS BI: CPT | Mod: 26,,, | Performed by: RADIOLOGY

## 2019-07-17 PROCEDURE — 77067 MAMMO DIGITAL SCREENING BILAT WITH TOMOSYNTHESIS_CAD: ICD-10-PCS | Mod: 26,,, | Performed by: RADIOLOGY

## 2020-01-20 RX ORDER — METFORMIN HYDROCHLORIDE 500 MG/1
2000 TABLET, EXTENDED RELEASE ORAL DAILY
Qty: 360 TABLET | Refills: 0 | Status: SHIPPED | OUTPATIENT
Start: 2020-01-20 | End: 2020-02-26 | Stop reason: SDUPTHER

## 2020-01-22 ENCOUNTER — LAB VISIT (OUTPATIENT)
Dept: LAB | Facility: HOSPITAL | Age: 60
End: 2020-01-22
Attending: FAMILY MEDICINE
Payer: COMMERCIAL

## 2020-01-22 DIAGNOSIS — E11.59 HYPERTENSION ASSOCIATED WITH DIABETES: ICD-10-CM

## 2020-01-22 DIAGNOSIS — I15.2 HYPERTENSION ASSOCIATED WITH DIABETES: ICD-10-CM

## 2020-01-22 LAB
ESTIMATED AVG GLUCOSE: 197 MG/DL (ref 68–131)
HBA1C MFR BLD HPLC: 8.5 % (ref 4–5.6)

## 2020-01-22 PROCEDURE — 83036 HEMOGLOBIN GLYCOSYLATED A1C: CPT

## 2020-01-22 PROCEDURE — 36415 COLL VENOUS BLD VENIPUNCTURE: CPT | Mod: PO

## 2020-01-30 ENCOUNTER — OFFICE VISIT (OUTPATIENT)
Dept: FAMILY MEDICINE | Facility: CLINIC | Age: 60
End: 2020-01-30
Payer: COMMERCIAL

## 2020-01-30 VITALS
BODY MASS INDEX: 30.1 KG/M2 | WEIGHT: 191.81 LBS | HEIGHT: 67 IN | HEART RATE: 91 BPM | TEMPERATURE: 99 F | SYSTOLIC BLOOD PRESSURE: 128 MMHG | DIASTOLIC BLOOD PRESSURE: 74 MMHG

## 2020-01-30 DIAGNOSIS — E11.9 DIABETES MELLITUS WITHOUT COMPLICATION: Primary | ICD-10-CM

## 2020-01-30 DIAGNOSIS — I15.2 HYPERTENSION ASSOCIATED WITH DIABETES: ICD-10-CM

## 2020-01-30 DIAGNOSIS — E11.59 HYPERTENSION ASSOCIATED WITH DIABETES: ICD-10-CM

## 2020-01-30 PROCEDURE — 3074F PR MOST RECENT SYSTOLIC BLOOD PRESSURE < 130 MM HG: ICD-10-PCS | Mod: CPTII,S$GLB,, | Performed by: FAMILY MEDICINE

## 2020-01-30 PROCEDURE — 99213 PR OFFICE/OUTPT VISIT, EST, LEVL III, 20-29 MIN: ICD-10-PCS | Mod: S$GLB,,, | Performed by: FAMILY MEDICINE

## 2020-01-30 PROCEDURE — 3078F PR MOST RECENT DIASTOLIC BLOOD PRESSURE < 80 MM HG: ICD-10-PCS | Mod: CPTII,S$GLB,, | Performed by: FAMILY MEDICINE

## 2020-01-30 PROCEDURE — 3078F DIAST BP <80 MM HG: CPT | Mod: CPTII,S$GLB,, | Performed by: FAMILY MEDICINE

## 2020-01-30 PROCEDURE — 3074F SYST BP LT 130 MM HG: CPT | Mod: CPTII,S$GLB,, | Performed by: FAMILY MEDICINE

## 2020-01-30 PROCEDURE — 99999 PR PBB SHADOW E&M-EST. PATIENT-LVL IV: ICD-10-PCS | Mod: PBBFAC,,, | Performed by: FAMILY MEDICINE

## 2020-01-30 PROCEDURE — 3008F BODY MASS INDEX DOCD: CPT | Mod: CPTII,S$GLB,, | Performed by: FAMILY MEDICINE

## 2020-01-30 PROCEDURE — 3008F PR BODY MASS INDEX (BMI) DOCUMENTED: ICD-10-PCS | Mod: CPTII,S$GLB,, | Performed by: FAMILY MEDICINE

## 2020-01-30 PROCEDURE — 99999 PR PBB SHADOW E&M-EST. PATIENT-LVL IV: CPT | Mod: PBBFAC,,, | Performed by: FAMILY MEDICINE

## 2020-01-30 PROCEDURE — 99213 OFFICE O/P EST LOW 20 MIN: CPT | Mod: S$GLB,,, | Performed by: FAMILY MEDICINE

## 2020-01-30 NOTE — PROGRESS NOTES
Follow-up diabetes.  Glucose elevated.  Did not bring readings, recalls between 150-190 fasting.  140-160 in the evening.  Blood pressure controlled.  States compliance medication      Marlene was seen today for follow-up.    Diagnoses and all orders for this visit:    Diabetes mellitus without complication    Hypertension associated with diabetes    Other orders  -     dulaglutide (TRULICITY) 0.75 mg/0.5 mL PnIj; Inject 1 syringe (0.5 mLs total) into the skin every 7 days.  -     MyChart Patient Entered Glucose     Follow-up 1 month home glucose readings.  Continue metformin and add Trulicity.  Recheck hemoglobin A1c 3 months.            Diabetes Management Status    Statin: Not taking  ACE/ARB: Not taking    Screening or Prevention Patient's value Goal Complete/Controlled?   HgA1C Testing and Control   Lab Results   Component Value Date    HGBA1C 8.5 (H) 01/22/2020      Annually/Less than 8% No   Lipid profile : 07/17/2019 Annually Yes   LDL control Lab Results   Component Value Date    LDLCALC 109.0 07/17/2019    Annually/Less than 100 mg/dl  No   Nephropathy screening Lab Results   Component Value Date    LABMICR 6.0 07/17/2019     Lab Results   Component Value Date    PROTEINUA Negative 03/08/2012    Annually Yes   Blood pressure BP Readings from Last 1 Encounters:   01/30/20 128/74    Less than 140/90 Yes   Dilated retinal exam : 05/09/2019 Annually Yes   Foot exam   : 05/21/2019 Annually Yes       Past Medical History:  Past Medical History:   Diagnosis Date    Abnormal Pap smear     ALLERGIC RHINITIS     Diabetes mellitus     Diverticulosis of colon (without mention of hemorrhage)     GERD (gastroesophageal reflux disease)     Heart murmur     Hyperlipidemia LDL goal < 100     Hypertension     Insomnia     Moderate cervical dysplasia      Past Surgical History:   Procedure Laterality Date    BREAST BIOPSY      Cold knife conization      COLONOSCOPY  8/9/2012         INCONTINENCE SURGERY       TVT-O    Tonsillectomy       Review of patient's allergies indicates:   Allergen Reactions    Corgard  [nadolol] Other (See Comments)    Fiorinal  [butalbital-aspirin-caffeine] Other (See Comments)    Percodan [oxycodone hcl-oxycodone-asa] Other (See Comments)    Pravastatin Other (See Comments)     Elevated liver enzymes     Current Outpatient Medications on File Prior to Visit   Medication Sig Dispense Refill    A/b-carotene/C/biofl/B6/mn/Ech (ECHINACEA C COMPLETE ORAL) Take 450 mg by mouth 2 (two) times daily.      aspirin (ECOTRIN) 81 MG EC tablet Take 1 tablet (81 mg total) by mouth once daily.      BLOOD SUGAR DIAGNOSTIC (BLOOD GLUCOSE TEST MISC) No Sig Provided      cetirizine (ZYRTEC) 10 MG tablet Take 1 tablet (10 mg total) by mouth once daily. 30 tablet 11    fluticasone propionate (FLONASE) 50 mcg/actuation nasal spray Instill 2 sprays (100 mcg total) in each nostril once daily. 48 g 3    guaifenesin-codeine 100-10 mg/5 ml (TUSSI-ORGANIDIN NR)  mg/5 mL syrup Take 5-10 ml po every 6 hours as needed for cough 118 mL 0    metFORMIN (GLUCOPHAGE-XR) 500 MG 24 hr tablet Take 4 tablets (2,000 mg total) by mouth once daily. 360 tablet 0    milk thistle 175 mg tablet Take 250 mg by mouth once daily.      multivitamin capsule Take 1 capsule by mouth once daily.        omeprazole (PRILOSEC) 20 MG capsule Take 20 mg by mouth every other day.      RED YEAST RICE ORAL Take by mouth once daily.      ubidecarenone (COENZYME Q10) 100 mg Tab Take 1 tablet by mouth once daily.       No current facility-administered medications on file prior to visit.      Social History     Socioeconomic History    Marital status:      Spouse name: Not on file    Number of children: Not on file    Years of education: Not on file    Highest education level: Not on file   Occupational History    Not on file   Social Needs    Financial resource strain: Not on file    Food insecurity:     Worry: Not on file  "    Inability: Not on file    Transportation needs:     Medical: Not on file     Non-medical: Not on file   Tobacco Use    Smoking status: Former Smoker     Last attempt to quit: 3/8/1995     Years since quittin.9    Smokeless tobacco: Never Used   Substance and Sexual Activity    Alcohol use: No    Drug use: No    Sexual activity: Never   Lifestyle    Physical activity:     Days per week: Not on file     Minutes per session: Not on file    Stress: Not on file   Relationships    Social connections:     Talks on phone: Not on file     Gets together: Not on file     Attends Gnosticist service: Not on file     Active member of club or organization: Not on file     Attends meetings of clubs or organizations: Not on file     Relationship status: Not on file   Other Topics Concern    Not on file   Social History Narrative    Not on file     Family History   Problem Relation Age of Onset    Cancer Mother     Diabetes Mother     Hypertension Mother     Depression Brother     Skin cancer Brother     HIV Brother     Diabetes Maternal Grandmother     Stroke Maternal Grandmother     Heart disease Maternal Grandfather     Stroke Paternal Grandmother     Breast cancer Maternal Aunt     Lymphoma Brother     Diabetes Brother     Hypertension Brother     Hyperlipidemia Brother     Ovarian cancer Neg Hx            ROS:  GENERAL: No fever, chills,  or significant weight changes.   CARDIOVASCULAR: Denies chest pain, PND, orthopnea or reduced exercise tolerance.  ABDOMEN: Appetite fine. Denies diarrhea, abdominal pain, hematemesis or blood in stool.  URINARY: No flank pain, dysuria or hematuria.    Vitals:    20 0828   BP: 128/74   Pulse: 91   Temp: 98.5 °F (36.9 °C)   Weight: 87 kg (191 lb 12.8 oz)   Height: 5' 7" (1.702 m)       Wt Readings from Last 3 Encounters:   20 87 kg (191 lb 12.8 oz)   19 85.3 kg (188 lb)   19 85.3 kg (188 lb)       APPEARANCE: Well nourished, well " developed, in no acute distress.    MENTAL STATUS: Alert.  Oriented x 3.

## 2020-02-11 ENCOUNTER — PATIENT OUTREACH (OUTPATIENT)
Dept: ADMINISTRATIVE | Facility: HOSPITAL | Age: 60
End: 2020-02-11

## 2020-02-11 NOTE — PROGRESS NOTES
Health Maintenance reviewed via DM report.  Immunization record updated with flu vaccine.  A1c done 1/22/20. Next A1c scheduled 4/29/19

## 2020-02-17 ENCOUNTER — TELEPHONE (OUTPATIENT)
Dept: FAMILY MEDICINE | Facility: CLINIC | Age: 60
End: 2020-02-17

## 2020-02-17 NOTE — TELEPHONE ENCOUNTER
----- Message from Lupe Gómez sent at 2/17/2020 11:24 AM CST -----  Contact: Patient   Patient would like to schedule a video visit, Please call her at [phone].    Thanks  Td

## 2020-02-17 NOTE — TELEPHONE ENCOUNTER
----- Message from Lisbet Roberto sent at 2/17/2020 11:53 AM CST -----  Contact: pt  .Type:  Patient Returning Call    Who Called:Patient  Who Left Message for Patient:nurse  Does the patient know what this is regarding?:natty video  appt   Would the patient rather a call back or a response via [facility]? Call back  Best Call Back Number:[phone] or  [phone]  Additional Information:na

## 2020-02-26 RX ORDER — METFORMIN HYDROCHLORIDE 500 MG/1
2000 TABLET, EXTENDED RELEASE ORAL DAILY
Qty: 360 TABLET | Refills: 0 | Status: CANCELLED | OUTPATIENT
Start: 2020-02-26 | End: 2020-03-27

## 2020-02-27 RX ORDER — METFORMIN HYDROCHLORIDE 500 MG/1
2000 TABLET, EXTENDED RELEASE ORAL DAILY
Qty: 360 TABLET | Refills: 0 | Status: SHIPPED | OUTPATIENT
Start: 2020-02-27 | End: 2020-07-07 | Stop reason: SDUPTHER

## 2020-02-27 NOTE — TELEPHONE ENCOUNTER
Patient was suppose to see Dr Pham today but was rescheduled due to him being sick, rescheduled to 3/9 but needs refill on Trulicity sent in to cover until then

## 2020-03-05 ENCOUNTER — TELEPHONE (OUTPATIENT)
Dept: FAMILY MEDICINE | Facility: CLINIC | Age: 60
End: 2020-03-05

## 2020-03-05 NOTE — TELEPHONE ENCOUNTER
----- Message from Heaven Tolliver sent at 3/5/2020 11:42 AM CST -----  Type:  Patient Returning Call    Who Called:  Christian Rosario  Who Left Message for Patient:  Dr Pham' office  Does the patient know what this is regarding?:  An appt  Would the patient rather a call back or a response via [facility]?   Call back  Best Call Back Number:  [phone]  Additional Information:  States she is returning your call/please call again/thanks/genet

## 2020-03-05 NOTE — TELEPHONE ENCOUNTER
----- Message from Sabrina Barnes sent at 3/5/2020  9:10 AM CST -----  Contact: Pt  Pt is requesting call back in regards to rescheduling virtual visit.          Pls call back at [phone]   2.05

## 2020-03-10 ENCOUNTER — PATIENT OUTREACH (OUTPATIENT)
Dept: ADMINISTRATIVE | Facility: HOSPITAL | Age: 60
End: 2020-03-10

## 2020-03-17 ENCOUNTER — OFFICE VISIT (OUTPATIENT)
Dept: FAMILY MEDICINE | Facility: CLINIC | Age: 60
End: 2020-03-17
Payer: COMMERCIAL

## 2020-03-17 DIAGNOSIS — E11.9 TYPE 2 DIABETES MELLITUS WITHOUT COMPLICATION, WITHOUT LONG-TERM CURRENT USE OF INSULIN: Primary | ICD-10-CM

## 2020-03-17 DIAGNOSIS — J30.9 ALLERGIC RHINITIS, UNSPECIFIED SEASONALITY, UNSPECIFIED TRIGGER: ICD-10-CM

## 2020-03-17 PROCEDURE — 99213 OFFICE O/P EST LOW 20 MIN: CPT | Mod: 95,,, | Performed by: FAMILY MEDICINE

## 2020-03-17 PROCEDURE — 3052F PR MOST RECENT HEMOGLOBIN A1C LEVEL 8.0 - < 9.0%: ICD-10-PCS | Mod: CPTII,GT,95, | Performed by: FAMILY MEDICINE

## 2020-03-17 PROCEDURE — 3052F HG A1C>EQUAL 8.0%<EQUAL 9.0%: CPT | Mod: CPTII,GT,95, | Performed by: FAMILY MEDICINE

## 2020-03-17 PROCEDURE — 99213 PR OFFICE/OUTPT VISIT, EST, LEVL III, 20-29 MIN: ICD-10-PCS | Mod: 95,,, | Performed by: FAMILY MEDICINE

## 2020-03-17 RX ORDER — CODEINE PHOSPHATE AND GUAIFENESIN 10; 100 MG/5ML; MG/5ML
SOLUTION ORAL
Qty: 118 ML | Refills: 0 | Status: SHIPPED | OUTPATIENT
Start: 2020-03-17 | End: 2021-11-17

## 2020-03-17 NOTE — PROGRESS NOTES
Primary Care Telemedicine Note    The patient location is:  Patient Home   The chief complaint leading to consultation is:  Diabetes  Total time spent with patient: 16 min      Visit type: Virtual visit with synchronous audio only and video  Each patient to whom he or she provides medical services by telemedicine is:  (1) informed of the relationship between the physician and patient and the respective role of any other health care provider with respect to management of the patient; and (2) notified that he or she may decline to receive medical services by telemedicine and may withdraw from such care at any time.    Follow-up diabetes.  Recently started Trulicity.  Home glucose readings looks significantly better.  See attached.  Also noted some mild congestion postnasal drainage slight cough past few days.  No fever chills or shortness of breath.          Marlene was seen today for follow-up.    Diagnoses and all orders for this visit:    Type 2 diabetes mellitus without complication, without long-term current use of insulin    Allergic rhinitis, unspecified seasonality, unspecified trigger    Other orders  -     guaifenesin-codeine 100-10 mg/5 ml (TUSSI-ORGANIDIN NR)  mg/5 mL syrup; Take 5-10 ml po every 6 hours as needed for cough  -     dulaglutide (TRULICITY) 0.75 mg/0.5 mL PnIj; Inject 1 syringe (0.5 mLs total) into the skin every 7 days.      Continue current medication.  Cough medication as above.  Follow-up laboratory as scheduled.  We gave her note to stay off work this week due to respiratory symptoms.  Follow-up appointment in the office if not improving.          Diabetes Management Status    Statin: Not taking  ACE/ARB: Not taking    Screening or Prevention Patient's value Goal Complete/Controlled?   HgA1C Testing and Control   Lab Results   Component Value Date    HGBA1C 8.5 (H) 01/22/2020      Annually/Less than 8% No   Lipid profile : 07/17/2019 Annually Yes   LDL control Lab Results    Component Value Date    LDLCALC 109.0 07/17/2019    Annually/Less than 100 mg/dl  No   Nephropathy screening Lab Results   Component Value Date    LABMICR 6.0 07/17/2019     Lab Results   Component Value Date    PROTEINUA Negative 03/08/2012    Annually Yes   Blood pressure BP Readings from Last 1 Encounters:   01/30/20 128/74    Less than 140/90 Yes   Dilated retinal exam : 05/09/2019 Annually Yes   Foot exam   : 05/21/2019 Annually Yes       Past Medical History:  Past Medical History:   Diagnosis Date    Abnormal Pap smear     ALLERGIC RHINITIS     Diabetes mellitus     Diverticulosis of colon (without mention of hemorrhage)     GERD (gastroesophageal reflux disease)     Heart murmur     Hyperlipidemia LDL goal < 100     Hypertension     Insomnia     Moderate cervical dysplasia      Past Surgical History:   Procedure Laterality Date    BREAST BIOPSY      Cold knife conization      COLONOSCOPY  8/9/2012         INCONTINENCE SURGERY      TVT-O    Tonsillectomy       Review of patient's allergies indicates:   Allergen Reactions    Corgard  [nadolol] Other (See Comments)    Fiorinal  [butalbital-aspirin-caffeine] Other (See Comments)    Percodan [oxycodone hcl-oxycodone-asa] Other (See Comments)    Pravastatin Other (See Comments)     Elevated liver enzymes     Current Outpatient Medications on File Prior to Visit   Medication Sig Dispense Refill    A/b-carotene/C/biofl/B6/mn/Ech (ECHINACEA C COMPLETE ORAL) Take 450 mg by mouth 2 (two) times daily.      aspirin (ECOTRIN) 81 MG EC tablet Take 1 tablet (81 mg total) by mouth once daily.      BLOOD SUGAR DIAGNOSTIC (BLOOD GLUCOSE TEST MISC) No Sig Provided      cetirizine (ZYRTEC) 10 MG tablet Take 1 tablet (10 mg total) by mouth once daily. 30 tablet 11    fluticasone propionate (FLONASE) 50 mcg/actuation nasal spray Instill 2 sprays (100 mcg total) in each nostril once daily. 48 g 3    metFORMIN (GLUCOPHAGE-XR) 500 MG XR 24hr tablet Take 4  tablets (2,000 mg total) by mouth once daily. 360 tablet 0    milk thistle 175 mg tablet Take 250 mg by mouth once daily.      multivitamin capsule Take 1 capsule by mouth once daily.        omeprazole (PRILOSEC) 20 MG capsule Take 20 mg by mouth every other day.      RED YEAST RICE ORAL Take by mouth once daily.      ubidecarenone (COENZYME Q10) 100 mg Tab Take 1 tablet by mouth once daily.      [DISCONTINUED] dulaglutide (TRULICITY) 0.75 mg/0.5 mL PnIj Inject 1 syringe (0.5 mLs total) into the skin every 7 days. 4 Syringe 0    [DISCONTINUED] guaifenesin-codeine 100-10 mg/5 ml (TUSSI-ORGANIDIN NR)  mg/5 mL syrup Take 5-10 ml po every 6 hours as needed for cough 118 mL 0     No current facility-administered medications on file prior to visit.      Social History     Socioeconomic History    Marital status:      Spouse name: Not on file    Number of children: Not on file    Years of education: Not on file    Highest education level: Not on file   Occupational History    Not on file   Social Needs    Financial resource strain: Not on file    Food insecurity:     Worry: Not on file     Inability: Not on file    Transportation needs:     Medical: Not on file     Non-medical: Not on file   Tobacco Use    Smoking status: Former Smoker     Last attempt to quit: 3/8/1995     Years since quittin.0    Smokeless tobacco: Never Used   Substance and Sexual Activity    Alcohol use: No    Drug use: No    Sexual activity: Never   Lifestyle    Physical activity:     Days per week: Not on file     Minutes per session: Not on file    Stress: Not on file   Relationships    Social connections:     Talks on phone: Not on file     Gets together: Not on file     Attends Sabianism service: Not on file     Active member of club or organization: Not on file     Attends meetings of clubs or organizations: Not on file     Relationship status: Not on file   Other Topics Concern    Not on file   Social  History Narrative    Not on file     Family History   Problem Relation Age of Onset    Cancer Mother     Diabetes Mother     Hypertension Mother     Depression Brother     Skin cancer Brother     HIV Brother     Diabetes Maternal Grandmother     Stroke Maternal Grandmother     Heart disease Maternal Grandfather     Stroke Paternal Grandmother     Breast cancer Maternal Aunt     Lymphoma Brother     Diabetes Brother     Hypertension Brother     Hyperlipidemia Brother     Ovarian cancer Neg Hx            ROS:  GENERAL: No fever, chills   CARDIOVASCULAR: Denies chest pain  ABDOMEN: Appetite fine. Denies diarrhea, abdominal pain, hematemesis or blood in stool.      There were no vitals filed for this visit.    Wt Readings from Last 3 Encounters:   01/30/20 87 kg (191 lb 12.8 oz)   07/17/19 85.3 kg (188 lb)   05/21/19 85.3 kg (188 lb)       APPEARANCE: Well nourished, well developed, in no acute distress.    MENTAL STATUS: Alert.  Oriented x 3.

## 2020-03-17 NOTE — LETTER
March 17, 2020         [facility]  [address] [address]  Phone: [phone]  Fax: [phone] March 17, 2020     Patient: Marlene Carrillo    YOB: 1960   Date of Visit: 3/17/2020       To Whom It May Concern:     Marlene Carrillo  his a patient under my care.  She was seen on March 17, 2020.  She should remain home and then return to work on March 23, 2020.    If you have any questions or concerns, please don't hesitate to call.    Sincerely,          Leonardo Pham MD

## 2020-04-21 DIAGNOSIS — Z01.84 ANTIBODY RESPONSE EXAMINATION: ICD-10-CM

## 2020-04-30 ENCOUNTER — LAB VISIT (OUTPATIENT)
Dept: LAB | Facility: HOSPITAL | Age: 60
End: 2020-04-30
Attending: FAMILY MEDICINE
Payer: COMMERCIAL

## 2020-04-30 DIAGNOSIS — I15.2 HYPERTENSION ASSOCIATED WITH DIABETES: ICD-10-CM

## 2020-04-30 DIAGNOSIS — E11.59 HYPERTENSION ASSOCIATED WITH DIABETES: ICD-10-CM

## 2020-04-30 LAB
ESTIMATED AVG GLUCOSE: 143 MG/DL (ref 68–131)
HBA1C MFR BLD HPLC: 6.6 % (ref 4–5.6)

## 2020-04-30 PROCEDURE — 83036 HEMOGLOBIN GLYCOSYLATED A1C: CPT

## 2020-04-30 PROCEDURE — 36415 COLL VENOUS BLD VENIPUNCTURE: CPT | Mod: PO

## 2020-05-21 DIAGNOSIS — Z01.84 ANTIBODY RESPONSE EXAMINATION: ICD-10-CM

## 2020-06-20 DIAGNOSIS — Z01.84 ANTIBODY RESPONSE EXAMINATION: ICD-10-CM

## 2020-07-07 RX ORDER — METFORMIN HYDROCHLORIDE 500 MG/1
2000 TABLET, EXTENDED RELEASE ORAL DAILY
Qty: 360 TABLET | Refills: 1 | Status: SHIPPED | OUTPATIENT
Start: 2020-07-07 | End: 2021-01-12 | Stop reason: SDUPTHER

## 2020-07-20 DIAGNOSIS — Z01.84 ANTIBODY RESPONSE EXAMINATION: ICD-10-CM

## 2020-08-19 DIAGNOSIS — Z01.84 ANTIBODY RESPONSE EXAMINATION: ICD-10-CM

## 2020-08-28 DIAGNOSIS — Z12.39 BREAST CANCER SCREENING: ICD-10-CM

## 2020-09-18 DIAGNOSIS — Z01.84 ANTIBODY RESPONSE EXAMINATION: ICD-10-CM

## 2020-09-29 ENCOUNTER — PATIENT MESSAGE (OUTPATIENT)
Dept: OTHER | Facility: OTHER | Age: 60
End: 2020-09-29

## 2020-10-01 ENCOUNTER — PATIENT MESSAGE (OUTPATIENT)
Dept: FAMILY MEDICINE | Facility: CLINIC | Age: 60
End: 2020-10-01

## 2020-10-05 ENCOUNTER — TELEPHONE (OUTPATIENT)
Dept: ADMINISTRATIVE | Facility: HOSPITAL | Age: 60
End: 2020-10-05

## 2020-10-06 ENCOUNTER — HOSPITAL ENCOUNTER (OUTPATIENT)
Dept: RADIOLOGY | Facility: HOSPITAL | Age: 60
Discharge: HOME | End: 2020-10-06
Attending: FAMILY MEDICINE
Payer: COMMERCIAL

## 2020-10-06 ENCOUNTER — PATIENT MESSAGE (OUTPATIENT)
Dept: ADMINISTRATIVE | Facility: HOSPITAL | Age: 60
End: 2020-10-06

## 2020-10-06 VITALS — WEIGHT: 191 LBS | HEIGHT: 67 IN | BODY MASS INDEX: 29.98 KG/M2

## 2020-10-06 DIAGNOSIS — Z12.39 BREAST CANCER SCREENING: ICD-10-CM

## 2020-10-06 PROCEDURE — 77067 MAMMO DIGITAL SCREENING BILAT WITH TOMOSYNTHESIS_CAD: ICD-10-PCS | Mod: 26,,, | Performed by: RADIOLOGY

## 2020-10-06 PROCEDURE — 77067 SCR MAMMO BI INCL CAD: CPT | Mod: 26,,, | Performed by: RADIOLOGY

## 2020-10-06 PROCEDURE — 77067 SCR MAMMO BI INCL CAD: CPT | Mod: TC,PO

## 2020-10-06 PROCEDURE — 77063 BREAST TOMOSYNTHESIS BI: CPT | Mod: 26,,, | Performed by: RADIOLOGY

## 2020-10-06 PROCEDURE — 77063 MAMMO DIGITAL SCREENING BILAT WITH TOMOSYNTHESIS_CAD: ICD-10-PCS | Mod: 26,,, | Performed by: RADIOLOGY

## 2020-10-18 DIAGNOSIS — Z01.84 ANTIBODY RESPONSE EXAMINATION: ICD-10-CM

## 2020-11-02 ENCOUNTER — LAB VISIT (OUTPATIENT)
Dept: LAB | Facility: HOSPITAL | Age: 60
End: 2020-11-02
Attending: FAMILY MEDICINE
Payer: COMMERCIAL

## 2020-11-02 DIAGNOSIS — E78.2 COMBINED HYPERLIPIDEMIA ASSOCIATED WITH TYPE 2 DIABETES MELLITUS: ICD-10-CM

## 2020-11-02 DIAGNOSIS — E11.59 HYPERTENSION ASSOCIATED WITH DIABETES: ICD-10-CM

## 2020-11-02 DIAGNOSIS — I15.2 HYPERTENSION ASSOCIATED WITH DIABETES: ICD-10-CM

## 2020-11-02 DIAGNOSIS — E11.69 COMBINED HYPERLIPIDEMIA ASSOCIATED WITH TYPE 2 DIABETES MELLITUS: ICD-10-CM

## 2020-11-02 LAB
ALBUMIN SERPL BCP-MCNC: 4 G/DL (ref 3.5–5.2)
ALP SERPL-CCNC: 98 U/L (ref 55–135)
ALT SERPL W/O P-5'-P-CCNC: 119 U/L (ref 10–44)
ANION GAP SERPL CALC-SCNC: 12 MMOL/L (ref 8–16)
AST SERPL-CCNC: 72 U/L (ref 10–40)
BILIRUB SERPL-MCNC: 0.3 MG/DL (ref 0.1–1)
BUN SERPL-MCNC: 16 MG/DL (ref 8–23)
CALCIUM SERPL-MCNC: 9 MG/DL (ref 8.7–10.5)
CHLORIDE SERPL-SCNC: 105 MMOL/L (ref 95–110)
CHOLEST SERPL-MCNC: 185 MG/DL (ref 120–199)
CHOLEST/HDLC SERPL: 3.8 {RATIO} (ref 2–5)
CO2 SERPL-SCNC: 24 MMOL/L (ref 23–29)
CREAT SERPL-MCNC: 0.7 MG/DL (ref 0.5–1.4)
EST. GFR  (AFRICAN AMERICAN): >60 ML/MIN/1.73 M^2
EST. GFR  (NON AFRICAN AMERICAN): >60 ML/MIN/1.73 M^2
GLUCOSE SERPL-MCNC: 121 MG/DL (ref 70–110)
HDLC SERPL-MCNC: 49 MG/DL (ref 40–75)
HDLC SERPL: 26.5 % (ref 20–50)
LDLC SERPL CALC-MCNC: 110.4 MG/DL (ref 63–159)
NONHDLC SERPL-MCNC: 136 MG/DL
POTASSIUM SERPL-SCNC: 4.1 MMOL/L (ref 3.5–5.1)
PROT SERPL-MCNC: 7.2 G/DL (ref 6–8.4)
SODIUM SERPL-SCNC: 141 MMOL/L (ref 136–145)
TRIGL SERPL-MCNC: 128 MG/DL (ref 30–150)

## 2020-11-02 PROCEDURE — 83036 HEMOGLOBIN GLYCOSYLATED A1C: CPT

## 2020-11-02 PROCEDURE — 36415 COLL VENOUS BLD VENIPUNCTURE: CPT | Mod: PO

## 2020-11-02 PROCEDURE — 80061 LIPID PANEL: CPT

## 2020-11-02 PROCEDURE — 80053 COMPREHEN METABOLIC PANEL: CPT

## 2020-11-03 ENCOUNTER — PATIENT MESSAGE (OUTPATIENT)
Dept: FAMILY MEDICINE | Facility: CLINIC | Age: 60
End: 2020-11-03

## 2020-11-03 LAB
ESTIMATED AVG GLUCOSE: 183 MG/DL (ref 68–131)
HBA1C MFR BLD HPLC: 8 % (ref 4–5.6)

## 2020-11-17 DIAGNOSIS — Z01.84 ANTIBODY RESPONSE EXAMINATION: ICD-10-CM

## 2020-12-11 ENCOUNTER — PATIENT MESSAGE (OUTPATIENT)
Dept: OTHER | Facility: OTHER | Age: 60
End: 2020-12-11

## 2020-12-17 ENCOUNTER — LAB VISIT (OUTPATIENT)
Dept: LAB | Facility: HOSPITAL | Age: 60
End: 2020-12-17
Attending: NURSE PRACTITIONER
Payer: COMMERCIAL

## 2020-12-17 ENCOUNTER — OFFICE VISIT (OUTPATIENT)
Dept: FAMILY MEDICINE | Facility: CLINIC | Age: 60
End: 2020-12-17
Payer: COMMERCIAL

## 2020-12-17 VITALS
BODY MASS INDEX: 29.92 KG/M2 | TEMPERATURE: 98 F | DIASTOLIC BLOOD PRESSURE: 87 MMHG | SYSTOLIC BLOOD PRESSURE: 141 MMHG | WEIGHT: 190.63 LBS | HEIGHT: 67 IN | HEART RATE: 82 BPM

## 2020-12-17 DIAGNOSIS — E11.65 TYPE 2 DIABETES MELLITUS WITH HYPERGLYCEMIA, UNSPECIFIED WHETHER LONG TERM INSULIN USE: Primary | ICD-10-CM

## 2020-12-17 DIAGNOSIS — R74.8 ELEVATED LIVER ENZYMES: ICD-10-CM

## 2020-12-17 DIAGNOSIS — K21.9 GASTROESOPHAGEAL REFLUX DISEASE, UNSPECIFIED WHETHER ESOPHAGITIS PRESENT: ICD-10-CM

## 2020-12-17 PROCEDURE — 3008F BODY MASS INDEX DOCD: CPT | Mod: CPTII,S$GLB,, | Performed by: NURSE PRACTITIONER

## 2020-12-17 PROCEDURE — 99999 PR PBB SHADOW E&M-EST. PATIENT-LVL V: CPT | Mod: PBBFAC,,, | Performed by: NURSE PRACTITIONER

## 2020-12-17 PROCEDURE — 3077F PR MOST RECENT SYSTOLIC BLOOD PRESSURE >= 140 MM HG: ICD-10-PCS | Mod: CPTII,S$GLB,, | Performed by: NURSE PRACTITIONER

## 2020-12-17 PROCEDURE — 99214 PR OFFICE/OUTPT VISIT, EST, LEVL IV, 30-39 MIN: ICD-10-PCS | Mod: S$GLB,,, | Performed by: NURSE PRACTITIONER

## 2020-12-17 PROCEDURE — 3079F PR MOST RECENT DIASTOLIC BLOOD PRESSURE 80-89 MM HG: ICD-10-PCS | Mod: CPTII,S$GLB,, | Performed by: NURSE PRACTITIONER

## 2020-12-17 PROCEDURE — 99214 OFFICE O/P EST MOD 30 MIN: CPT | Mod: S$GLB,,, | Performed by: NURSE PRACTITIONER

## 2020-12-17 PROCEDURE — 3077F SYST BP >= 140 MM HG: CPT | Mod: CPTII,S$GLB,, | Performed by: NURSE PRACTITIONER

## 2020-12-17 PROCEDURE — 80074 ACUTE HEPATITIS PANEL: CPT

## 2020-12-17 PROCEDURE — 3052F PR MOST RECENT HEMOGLOBIN A1C LEVEL 8.0 - < 9.0%: ICD-10-PCS | Mod: CPTII,S$GLB,, | Performed by: NURSE PRACTITIONER

## 2020-12-17 PROCEDURE — 36415 COLL VENOUS BLD VENIPUNCTURE: CPT | Mod: PO

## 2020-12-17 PROCEDURE — 87338 HPYLORI STOOL AG IA: CPT

## 2020-12-17 PROCEDURE — 3008F PR BODY MASS INDEX (BMI) DOCUMENTED: ICD-10-PCS | Mod: CPTII,S$GLB,, | Performed by: NURSE PRACTITIONER

## 2020-12-17 PROCEDURE — 3052F HG A1C>EQUAL 8.0%<EQUAL 9.0%: CPT | Mod: CPTII,S$GLB,, | Performed by: NURSE PRACTITIONER

## 2020-12-17 PROCEDURE — 99999 PR PBB SHADOW E&M-EST. PATIENT-LVL V: ICD-10-PCS | Mod: PBBFAC,,, | Performed by: NURSE PRACTITIONER

## 2020-12-17 PROCEDURE — 3079F DIAST BP 80-89 MM HG: CPT | Mod: CPTII,S$GLB,, | Performed by: NURSE PRACTITIONER

## 2020-12-17 RX ORDER — CHOLECALCIFEROL (VITAMIN D3) 25 MCG
1000 TABLET ORAL DAILY
COMMUNITY

## 2020-12-17 RX ORDER — HYDROGEN PEROXIDE 3 %
20 SOLUTION, NON-ORAL MISCELLANEOUS
COMMUNITY
End: 2023-05-02

## 2020-12-17 RX ORDER — PNV NO.95/FERROUS FUM/FOLIC AC 28MG-0.8MG
100 TABLET ORAL DAILY
COMMUNITY
End: 2023-05-02

## 2020-12-17 NOTE — PROGRESS NOTES
"Subjective:       Patient ID: Marlene Carrillo is a 60 y.o. female.    Chief Complaint: Follow-up (review labs and medications)    Diabetes  She presents for her follow-up diabetic visit. She has type 2 diabetes mellitus. No MedicAlert identification noted. Her disease course has been fluctuating. There are no hypoglycemic associated symptoms. There are no diabetic associated symptoms. Pertinent negatives for diabetes include no chest pain, no fatigue and no weight loss. There are no hypoglycemic complications. Symptoms are worsening. There are no diabetic complications. Risk factors for coronary artery disease include diabetes mellitus, obesity and post-menopausal. Current diabetic treatment includes insulin injections and oral agent (monotherapy) (trulicity, metformin). She is compliant with treatment most of the time. Her weight is fluctuating minimally. She is following a generally unhealthy and high fat/cholesterol (Pt states does not adhere to ADA, low cholesterol diet; states, "I know why my sugars I up. I just haven't been eating right. I've been eating what I want.") diet. She has not had a previous visit with a dietitian. She rarely participates in exercise. Blood glucose monitoring compliance is inadequate (Pt states misplaced glucometer, but has ordered a new one). Her home blood glucose trend is increasing steadily. An ACE inhibitor/angiotensin II receptor blocker is not being taken. She does not see a podiatrist (Declines podiatry at present; foot exam done today).Eye exam is current (Report given today; scanned into chart; exam performed by Dr. Hudson).   Gastroesophageal Reflux  She complains of heartburn. She reports no abdominal pain, no belching, no chest pain, no choking, no coughing, no dysphagia, no early satiety, no globus sensation, no hoarse voice, no nausea, no sore throat, no stridor, no tooth decay, no water brash or no wheezing. This is a chronic problem. The current episode started more " than 1 year ago (States had not been taking medication; since starting nexium daily, GERD improved). The problem occurs frequently. The problem has been gradually improving. The heartburn duration is several minutes. The heartburn is located in the substernum. The heartburn is of moderate intensity. The heartburn wakes her from sleep. The heartburn does not limit her activity. The heartburn changes with position. The symptoms are aggravated by certain foods and lying down. Pertinent negatives include no anemia, fatigue, melena, muscle weakness, orthopnea or weight loss. Risk factors include lack of exercise. She has tried a PPI for the symptoms. The treatment provided significant relief. Past procedures do not include an abdominal ultrasound, an EGD, esophageal manometry, esophageal pH monitoring, H. pylori antibody titer or a UGI. Past invasive treatments do not include gastroplasty, gastroplication or reflux surgery.   Pt also has elevated liver enzymes; denies starting any new medications, alcohol consumption; does have fatty liver and states has been eating fried fish often. Pt advised to adjust diet, avoid foods high in cholesterol; verbalized understanding. Pt states would prefer to try dietary adjustment, prior to adjusting medications; recheck Hgb A1C, hepatic function in 3 m.   Past Medical History:   Diagnosis Date    Abnormal Pap smear     ALLERGIC RHINITIS     Diabetes mellitus     Diverticulosis of colon (without mention of hemorrhage)     GERD (gastroesophageal reflux disease)     Heart murmur     Hyperlipidemia LDL goal < 100     Hypertension     Insomnia     Moderate cervical dysplasia      Social History     Socioeconomic History    Marital status:      Spouse name: Not on file    Number of children: Not on file    Years of education: Not on file    Highest education level: Not on file   Occupational History    Not on file   Social Needs    Financial resource strain: Not on  file    Food insecurity     Worry: Not on file     Inability: Not on file    Transportation needs     Medical: Not on file     Non-medical: Not on file   Tobacco Use    Smoking status: Former Smoker     Packs/day: 0.00     Years: 0.00     Pack years: 0.00     Quit date: 3/8/1995     Years since quittin.7    Smokeless tobacco: Never Used   Substance and Sexual Activity    Alcohol use: No    Drug use: No    Sexual activity: Never   Lifestyle    Physical activity     Days per week: Not on file     Minutes per session: Not on file    Stress: Not on file   Relationships    Social connections     Talks on phone: Not on file     Gets together: Not on file     Attends Jain service: Not on file     Active member of club or organization: Not on file     Attends meetings of clubs or organizations: Not on file     Relationship status: Not on file   Other Topics Concern    Not on file   Social History Narrative    Not on file     Past Surgical History:   Procedure Laterality Date    BREAST BIOPSY      Cold knife conization      COLONOSCOPY  2012         INCONTINENCE SURGERY      TVT-O    Tonsillectomy      TONSILLECTOMY         Review of Systems   Constitutional: Negative.  Negative for fatigue and weight loss.   HENT: Negative.  Negative for hoarse voice and sore throat.    Eyes: Negative.    Respiratory: Negative for cough, choking and wheezing.    Cardiovascular: Negative.  Negative for chest pain.   Gastrointestinal: Positive for heartburn. Negative for abdominal pain, dysphagia, melena and nausea.   Endocrine: Negative.    Genitourinary: Negative.    Musculoskeletal: Negative.  Negative for muscle weakness.   Integumentary:  Negative.   Allergic/Immunologic: Negative.    Neurological: Negative.    Psychiatric/Behavioral: Negative.          Objective:      Physical Exam  Vitals signs and nursing note reviewed.   Constitutional:       Appearance: Normal appearance.   HENT:      Head:  Normocephalic.      Right Ear: Tympanic membrane, ear canal and external ear normal.      Left Ear: Tympanic membrane, ear canal and external ear normal.      Nose: Nose normal.      Mouth/Throat:      Mouth: Mucous membranes are moist.      Pharynx: Oropharynx is clear.   Eyes:      Pupils: Pupils are equal, round, and reactive to light.   Neck:      Musculoskeletal: Normal range of motion and neck supple.   Cardiovascular:      Rate and Rhythm: Normal rate and regular rhythm.      Pulses: Normal pulses.      Heart sounds: Normal heart sounds.   Pulmonary:      Effort: Pulmonary effort is normal.      Breath sounds: Normal breath sounds.   Abdominal:      General: Bowel sounds are normal.      Palpations: Abdomen is soft.   Musculoskeletal: Normal range of motion.   Skin:     General: Skin is warm and dry.      Capillary Refill: Capillary refill takes 2 to 3 seconds.   Neurological:      Mental Status: She is alert and oriented to person, place, and time.   Psychiatric:         Mood and Affect: Mood normal.         Behavior: Behavior normal.         Thought Content: Thought content normal.         Judgment: Judgment normal.         Assessment:       1. Type 2 diabetes mellitus with hyperglycemia, unspecified whether long term insulin use    2. Elevated liver enzymes    3. Gastroesophageal reflux disease, unspecified whether esophagitis present        Plan:           Marlene was seen today for follow-up.    Diagnoses and all orders for this visit:    Type 2 diabetes mellitus with hyperglycemia, unspecified whether long term insulin use  -     Hemoglobin A1C; Future    Elevated liver enzymes  -     Hepatitis Panel, Acute; Future  -     Hepatic Function Panel; Future  -     US Abdomen Limited; Future    Gastroesophageal reflux disease, unspecified whether esophagitis present  -     H. pylori antigen, stool; Future    Diabetic foot exam:   Left: Reflexes 2+    Vibratory sensation normal   Proprioception  normal   Sharp/dull discrimination normal   Filament test present  Right: Reflexes 2+    Vibratory sensation normal   Proprioception normal   Sharp/dull discrimination normal   Filament test present

## 2020-12-17 NOTE — PATIENT INSTRUCTIONS
ADA, low cholesterol diet recommended  Avoid alcohol, NSAIDs if applicable  Hgb A1C, hepatic function panel in 3 m  Continue current medications  Avoid GERD inducing foods, beverages  Report to ER immediately if symptoms worsen

## 2020-12-18 LAB
HAV IGM SERPL QL IA: NEGATIVE
HBV CORE IGM SERPL QL IA: NEGATIVE
HBV SURFACE AG SERPL QL IA: NEGATIVE
HCV AB SERPL QL IA: NEGATIVE

## 2020-12-23 LAB — H PYLORI AG STL QL IA: NOT DETECTED

## 2021-01-12 RX ORDER — METFORMIN HYDROCHLORIDE 500 MG/1
2000 TABLET, EXTENDED RELEASE ORAL DAILY
Qty: 360 TABLET | Refills: 0 | Status: SHIPPED | OUTPATIENT
Start: 2021-01-12 | End: 2021-04-03 | Stop reason: SDUPTHER

## 2021-03-05 ENCOUNTER — PATIENT OUTREACH (OUTPATIENT)
Dept: ADMINISTRATIVE | Facility: HOSPITAL | Age: 61
End: 2021-03-05

## 2021-03-22 ENCOUNTER — TELEPHONE (OUTPATIENT)
Dept: RADIOLOGY | Facility: HOSPITAL | Age: 61
End: 2021-03-22

## 2021-03-23 ENCOUNTER — HOSPITAL ENCOUNTER (OUTPATIENT)
Dept: RADIOLOGY | Facility: HOSPITAL | Age: 61
Discharge: HOME | End: 2021-03-23
Attending: NURSE PRACTITIONER
Payer: COMMERCIAL

## 2021-03-23 DIAGNOSIS — R74.8 ELEVATED LIVER ENZYMES: ICD-10-CM

## 2021-03-23 PROCEDURE — 76705 ECHO EXAM OF ABDOMEN: CPT | Mod: 26,,, | Performed by: RADIOLOGY

## 2021-03-23 PROCEDURE — 76705 US ABDOMEN LIMITED: ICD-10-PCS | Mod: 26,,, | Performed by: RADIOLOGY

## 2021-03-23 PROCEDURE — 76705 ECHO EXAM OF ABDOMEN: CPT | Mod: TC,PO

## 2021-03-24 ENCOUNTER — PATIENT MESSAGE (OUTPATIENT)
Dept: FAMILY MEDICINE | Facility: CLINIC | Age: 61
End: 2021-03-24

## 2021-03-25 RX ORDER — DULAGLUTIDE 0.75 MG/.5ML
0.75 INJECTION, SOLUTION SUBCUTANEOUS
Qty: 12 PEN | Refills: 1 | Status: SHIPPED | OUTPATIENT
Start: 2021-03-25 | End: 2021-03-30 | Stop reason: SDUPTHER

## 2021-03-25 RX ORDER — DULAGLUTIDE 0.75 MG/.5ML
0.75 INJECTION, SOLUTION SUBCUTANEOUS
Status: CANCELLED | OUTPATIENT
Start: 2021-03-25

## 2021-03-30 ENCOUNTER — TELEPHONE (OUTPATIENT)
Dept: FAMILY MEDICINE | Facility: CLINIC | Age: 61
End: 2021-03-30

## 2021-03-30 DIAGNOSIS — R74.8 ELEVATED LIVER ENZYMES: ICD-10-CM

## 2021-03-30 DIAGNOSIS — E11.65 TYPE 2 DIABETES MELLITUS WITH HYPERGLYCEMIA, UNSPECIFIED WHETHER LONG TERM INSULIN USE: Primary | ICD-10-CM

## 2021-03-30 RX ORDER — DULAGLUTIDE 0.75 MG/.5ML
0.75 INJECTION, SOLUTION SUBCUTANEOUS
Qty: 12 PEN | Refills: 3 | Status: SHIPPED | OUTPATIENT
Start: 2021-03-30 | End: 2023-05-02

## 2021-04-05 RX ORDER — METFORMIN HYDROCHLORIDE 500 MG/1
2000 TABLET, EXTENDED RELEASE ORAL DAILY
Qty: 360 TABLET | Refills: 2 | Status: SHIPPED | OUTPATIENT
Start: 2021-04-05 | End: 2021-12-25 | Stop reason: SDUPTHER

## 2021-04-29 ENCOUNTER — PATIENT MESSAGE (OUTPATIENT)
Dept: RESEARCH | Facility: HOSPITAL | Age: 61
End: 2021-04-29

## 2021-07-16 ENCOUNTER — PATIENT MESSAGE (OUTPATIENT)
Dept: ADMINISTRATIVE | Facility: HOSPITAL | Age: 61
End: 2021-07-16

## 2021-07-16 ENCOUNTER — PATIENT OUTREACH (OUTPATIENT)
Dept: ADMINISTRATIVE | Facility: HOSPITAL | Age: 61
End: 2021-07-16

## 2021-08-04 ENCOUNTER — PATIENT MESSAGE (OUTPATIENT)
Dept: ADMINISTRATIVE | Facility: HOSPITAL | Age: 61
End: 2021-08-04

## 2021-09-07 ENCOUNTER — OFFICE VISIT (OUTPATIENT)
Dept: FAMILY MEDICINE | Facility: CLINIC | Age: 61
End: 2021-09-07
Payer: COMMERCIAL

## 2021-09-07 VITALS
HEART RATE: 82 BPM | BODY MASS INDEX: 29.24 KG/M2 | SYSTOLIC BLOOD PRESSURE: 118 MMHG | WEIGHT: 186.31 LBS | DIASTOLIC BLOOD PRESSURE: 82 MMHG | HEIGHT: 67 IN | OXYGEN SATURATION: 97 %

## 2021-09-07 DIAGNOSIS — M54.31 SCIATICA OF RIGHT SIDE: Primary | ICD-10-CM

## 2021-09-07 PROCEDURE — 1159F MED LIST DOCD IN RCRD: CPT | Mod: CPTII,S$GLB,, | Performed by: PHYSICIAN ASSISTANT

## 2021-09-07 PROCEDURE — 99999 PR PBB SHADOW E&M-EST. PATIENT-LVL IV: ICD-10-PCS | Mod: PBBFAC,,, | Performed by: PHYSICIAN ASSISTANT

## 2021-09-07 PROCEDURE — 3051F HG A1C>EQUAL 7.0%<8.0%: CPT | Mod: CPTII,S$GLB,, | Performed by: PHYSICIAN ASSISTANT

## 2021-09-07 PROCEDURE — 3008F BODY MASS INDEX DOCD: CPT | Mod: CPTII,S$GLB,, | Performed by: PHYSICIAN ASSISTANT

## 2021-09-07 PROCEDURE — 3079F DIAST BP 80-89 MM HG: CPT | Mod: CPTII,S$GLB,, | Performed by: PHYSICIAN ASSISTANT

## 2021-09-07 PROCEDURE — 99214 PR OFFICE/OUTPT VISIT, EST, LEVL IV, 30-39 MIN: ICD-10-PCS | Mod: S$GLB,,, | Performed by: PHYSICIAN ASSISTANT

## 2021-09-07 PROCEDURE — 3079F PR MOST RECENT DIASTOLIC BLOOD PRESSURE 80-89 MM HG: ICD-10-PCS | Mod: CPTII,S$GLB,, | Performed by: PHYSICIAN ASSISTANT

## 2021-09-07 PROCEDURE — 3051F PR MOST RECENT HEMOGLOBIN A1C LEVEL 7.0 - < 8.0%: ICD-10-PCS | Mod: CPTII,S$GLB,, | Performed by: PHYSICIAN ASSISTANT

## 2021-09-07 PROCEDURE — 3074F PR MOST RECENT SYSTOLIC BLOOD PRESSURE < 130 MM HG: ICD-10-PCS | Mod: CPTII,S$GLB,, | Performed by: PHYSICIAN ASSISTANT

## 2021-09-07 PROCEDURE — 99999 PR PBB SHADOW E&M-EST. PATIENT-LVL IV: CPT | Mod: PBBFAC,,, | Performed by: PHYSICIAN ASSISTANT

## 2021-09-07 PROCEDURE — 3008F PR BODY MASS INDEX (BMI) DOCUMENTED: ICD-10-PCS | Mod: CPTII,S$GLB,, | Performed by: PHYSICIAN ASSISTANT

## 2021-09-07 PROCEDURE — 3074F SYST BP LT 130 MM HG: CPT | Mod: CPTII,S$GLB,, | Performed by: PHYSICIAN ASSISTANT

## 2021-09-07 PROCEDURE — 99214 OFFICE O/P EST MOD 30 MIN: CPT | Mod: S$GLB,,, | Performed by: PHYSICIAN ASSISTANT

## 2021-09-07 PROCEDURE — 1159F PR MEDICATION LIST DOCUMENTED IN MEDICAL RECORD: ICD-10-PCS | Mod: CPTII,S$GLB,, | Performed by: PHYSICIAN ASSISTANT

## 2021-09-07 RX ORDER — TIZANIDINE 4 MG/1
4 TABLET ORAL EVERY 6 HOURS PRN
Qty: 10 TABLET | Refills: 0 | Status: SHIPPED | OUTPATIENT
Start: 2021-09-07 | End: 2021-09-17

## 2021-09-07 RX ORDER — NAPROXEN 500 MG/1
500 TABLET ORAL 2 TIMES DAILY
Qty: 14 TABLET | Refills: 0 | Status: SHIPPED | OUTPATIENT
Start: 2021-09-07 | End: 2021-09-14

## 2021-10-04 ENCOUNTER — LAB VISIT (OUTPATIENT)
Dept: LAB | Facility: HOSPITAL | Age: 61
End: 2021-10-04
Attending: NURSE PRACTITIONER
Payer: COMMERCIAL

## 2021-10-04 DIAGNOSIS — E11.65 TYPE 2 DIABETES MELLITUS WITH HYPERGLYCEMIA, UNSPECIFIED WHETHER LONG TERM INSULIN USE: ICD-10-CM

## 2021-10-04 DIAGNOSIS — R74.8 ELEVATED LIVER ENZYMES: ICD-10-CM

## 2021-10-04 PROCEDURE — 36415 COLL VENOUS BLD VENIPUNCTURE: CPT | Mod: PO | Performed by: NURSE PRACTITIONER

## 2021-10-04 PROCEDURE — 80076 HEPATIC FUNCTION PANEL: CPT | Performed by: NURSE PRACTITIONER

## 2021-10-04 PROCEDURE — 83036 HEMOGLOBIN GLYCOSYLATED A1C: CPT | Performed by: NURSE PRACTITIONER

## 2021-10-05 LAB
ALBUMIN SERPL BCP-MCNC: 4 G/DL (ref 3.5–5.2)
ALP SERPL-CCNC: 96 U/L (ref 55–135)
ALT SERPL W/O P-5'-P-CCNC: 61 U/L (ref 10–44)
AST SERPL-CCNC: 33 U/L (ref 10–40)
BILIRUB DIRECT SERPL-MCNC: <0.1 MG/DL (ref 0.1–0.3)
BILIRUB SERPL-MCNC: 0.2 MG/DL (ref 0.1–1)
ESTIMATED AVG GLUCOSE: 148 MG/DL (ref 68–131)
HBA1C MFR BLD: 6.8 % (ref 4–5.6)
PROT SERPL-MCNC: 7.3 G/DL (ref 6–8.4)

## 2021-10-18 ENCOUNTER — PATIENT MESSAGE (OUTPATIENT)
Dept: ADMINISTRATIVE | Facility: HOSPITAL | Age: 61
End: 2021-10-18
Payer: COMMERCIAL

## 2021-11-04 ENCOUNTER — PATIENT MESSAGE (OUTPATIENT)
Dept: FAMILY MEDICINE | Facility: CLINIC | Age: 61
End: 2021-11-04
Payer: COMMERCIAL

## 2021-11-04 DIAGNOSIS — Z12.31 VISIT FOR SCREENING MAMMOGRAM: Primary | ICD-10-CM

## 2021-11-09 ENCOUNTER — HOSPITAL ENCOUNTER (OUTPATIENT)
Dept: RADIOLOGY | Facility: HOSPITAL | Age: 61
Discharge: HOME | End: 2021-11-09
Payer: COMMERCIAL

## 2021-11-09 VITALS — HEIGHT: 67 IN | BODY MASS INDEX: 29.24 KG/M2 | WEIGHT: 186.31 LBS

## 2021-11-09 DIAGNOSIS — Z12.31 SCREENING MAMMOGRAM, ENCOUNTER FOR: ICD-10-CM

## 2021-11-09 PROCEDURE — 77063 MAMMO DIGITAL SCREENING BILAT WITH TOMO: ICD-10-PCS | Mod: 26,,, | Performed by: RADIOLOGY

## 2021-11-09 PROCEDURE — 77067 MAMMO DIGITAL SCREENING BILAT WITH TOMO: ICD-10-PCS | Mod: 26,,, | Performed by: RADIOLOGY

## 2021-11-09 PROCEDURE — 77067 SCR MAMMO BI INCL CAD: CPT | Mod: 26,,, | Performed by: RADIOLOGY

## 2021-11-09 PROCEDURE — 77063 BREAST TOMOSYNTHESIS BI: CPT | Mod: 26,,, | Performed by: RADIOLOGY

## 2021-11-09 PROCEDURE — 77067 SCR MAMMO BI INCL CAD: CPT | Mod: TC,PO

## 2021-11-17 ENCOUNTER — OFFICE VISIT (OUTPATIENT)
Dept: FAMILY MEDICINE | Facility: CLINIC | Age: 61
End: 2021-11-17
Payer: COMMERCIAL

## 2021-11-17 ENCOUNTER — LAB VISIT (OUTPATIENT)
Dept: LAB | Facility: HOSPITAL | Age: 61
End: 2021-11-17
Attending: FAMILY MEDICINE
Payer: COMMERCIAL

## 2021-11-17 VITALS
WEIGHT: 184 LBS | HEART RATE: 92 BPM | TEMPERATURE: 97 F | SYSTOLIC BLOOD PRESSURE: 146 MMHG | DIASTOLIC BLOOD PRESSURE: 74 MMHG | HEIGHT: 67 IN | BODY MASS INDEX: 28.88 KG/M2

## 2021-11-17 DIAGNOSIS — E11.69 COMBINED HYPERLIPIDEMIA ASSOCIATED WITH TYPE 2 DIABETES MELLITUS: ICD-10-CM

## 2021-11-17 DIAGNOSIS — I15.2 HYPERTENSION ASSOCIATED WITH DIABETES: ICD-10-CM

## 2021-11-17 DIAGNOSIS — Z78.9 STATIN INTOLERANCE: ICD-10-CM

## 2021-11-17 DIAGNOSIS — Z79.899 ENCOUNTER FOR LONG-TERM (CURRENT) USE OF MEDICATIONS: ICD-10-CM

## 2021-11-17 DIAGNOSIS — E78.2 COMBINED HYPERLIPIDEMIA ASSOCIATED WITH TYPE 2 DIABETES MELLITUS: ICD-10-CM

## 2021-11-17 DIAGNOSIS — E11.59 HYPERTENSION ASSOCIATED WITH DIABETES: Primary | ICD-10-CM

## 2021-11-17 DIAGNOSIS — G51.0 LEFT-SIDED BELL'S PALSY: ICD-10-CM

## 2021-11-17 DIAGNOSIS — I15.2 HYPERTENSION ASSOCIATED WITH DIABETES: Primary | ICD-10-CM

## 2021-11-17 DIAGNOSIS — K76.0 FATTY LIVER: ICD-10-CM

## 2021-11-17 DIAGNOSIS — E11.59 HYPERTENSION ASSOCIATED WITH DIABETES: ICD-10-CM

## 2021-11-17 LAB
ALBUMIN/CREAT UR: 5.9 UG/MG (ref 0–30)
CREAT UR-MCNC: 169 MG/DL (ref 15–325)
MICROALBUMIN UR DL<=1MG/L-MCNC: 10 UG/ML

## 2021-11-17 PROCEDURE — 3078F PR MOST RECENT DIASTOLIC BLOOD PRESSURE < 80 MM HG: ICD-10-PCS | Mod: CPTII,S$GLB,, | Performed by: FAMILY MEDICINE

## 2021-11-17 PROCEDURE — 3077F PR MOST RECENT SYSTOLIC BLOOD PRESSURE >= 140 MM HG: ICD-10-PCS | Mod: CPTII,S$GLB,, | Performed by: FAMILY MEDICINE

## 2021-11-17 PROCEDURE — 99396 PREV VISIT EST AGE 40-64: CPT | Mod: S$GLB,,, | Performed by: FAMILY MEDICINE

## 2021-11-17 PROCEDURE — 3044F PR MOST RECENT HEMOGLOBIN A1C LEVEL <7.0%: ICD-10-PCS | Mod: CPTII,S$GLB,, | Performed by: FAMILY MEDICINE

## 2021-11-17 PROCEDURE — 99396 PR PREVENTIVE VISIT,EST,40-64: ICD-10-PCS | Mod: S$GLB,,, | Performed by: FAMILY MEDICINE

## 2021-11-17 PROCEDURE — 1159F PR MEDICATION LIST DOCUMENTED IN MEDICAL RECORD: ICD-10-PCS | Mod: CPTII,S$GLB,, | Performed by: FAMILY MEDICINE

## 2021-11-17 PROCEDURE — 3078F DIAST BP <80 MM HG: CPT | Mod: CPTII,S$GLB,, | Performed by: FAMILY MEDICINE

## 2021-11-17 PROCEDURE — 1159F MED LIST DOCD IN RCRD: CPT | Mod: CPTII,S$GLB,, | Performed by: FAMILY MEDICINE

## 2021-11-17 PROCEDURE — 3008F BODY MASS INDEX DOCD: CPT | Mod: CPTII,S$GLB,, | Performed by: FAMILY MEDICINE

## 2021-11-17 PROCEDURE — 82570 ASSAY OF URINE CREATININE: CPT | Performed by: FAMILY MEDICINE

## 2021-11-17 PROCEDURE — 99999 PR PBB SHADOW E&M-EST. PATIENT-LVL IV: ICD-10-PCS | Mod: PBBFAC,,, | Performed by: FAMILY MEDICINE

## 2021-11-17 PROCEDURE — 3008F PR BODY MASS INDEX (BMI) DOCUMENTED: ICD-10-PCS | Mod: CPTII,S$GLB,, | Performed by: FAMILY MEDICINE

## 2021-11-17 PROCEDURE — 3044F HG A1C LEVEL LT 7.0%: CPT | Mod: CPTII,S$GLB,, | Performed by: FAMILY MEDICINE

## 2021-11-17 PROCEDURE — 3077F SYST BP >= 140 MM HG: CPT | Mod: CPTII,S$GLB,, | Performed by: FAMILY MEDICINE

## 2021-11-17 PROCEDURE — 99999 PR PBB SHADOW E&M-EST. PATIENT-LVL IV: CPT | Mod: PBBFAC,,, | Performed by: FAMILY MEDICINE

## 2021-11-18 ENCOUNTER — CLINICAL SUPPORT (OUTPATIENT)
Dept: FAMILY MEDICINE | Facility: CLINIC | Age: 61
End: 2021-11-18
Payer: COMMERCIAL

## 2021-11-18 ENCOUNTER — HOSPITAL ENCOUNTER (OUTPATIENT)
Dept: RADIOLOGY | Facility: HOSPITAL | Age: 61
Discharge: HOME | End: 2021-11-18
Attending: FAMILY MEDICINE
Payer: COMMERCIAL

## 2021-11-18 VITALS — DIASTOLIC BLOOD PRESSURE: 77 MMHG | SYSTOLIC BLOOD PRESSURE: 139 MMHG | HEART RATE: 97 BPM

## 2021-11-18 DIAGNOSIS — Z01.30 BP CHECK: Primary | ICD-10-CM

## 2021-11-18 DIAGNOSIS — R92.8 ABNORMAL MAMMOGRAM: ICD-10-CM

## 2021-11-18 PROCEDURE — 77065 MAMMO DIGITAL DIAGNOSTIC RIGHT: ICD-10-PCS | Mod: 26,RT,, | Performed by: RADIOLOGY

## 2021-11-18 PROCEDURE — 99999 PR PBB SHADOW E&M-EST. PATIENT-LVL II: ICD-10-PCS | Mod: PBBFAC,,,

## 2021-11-18 PROCEDURE — 77065 DX MAMMO INCL CAD UNI: CPT | Mod: 26,RT,, | Performed by: RADIOLOGY

## 2021-11-18 PROCEDURE — 99999 PR PBB SHADOW E&M-EST. PATIENT-LVL II: CPT | Mod: PBBFAC,,,

## 2021-11-18 PROCEDURE — 77065 DX MAMMO INCL CAD UNI: CPT | Mod: TC,PO,RT

## 2021-11-19 ENCOUNTER — TELEPHONE (OUTPATIENT)
Dept: RADIOLOGY | Facility: HOSPITAL | Age: 61
End: 2021-11-19
Payer: COMMERCIAL

## 2021-11-22 ENCOUNTER — TELEPHONE (OUTPATIENT)
Dept: RADIOLOGY | Facility: HOSPITAL | Age: 61
End: 2021-11-22
Payer: COMMERCIAL

## 2021-11-30 ENCOUNTER — HOSPITAL ENCOUNTER (OUTPATIENT)
Dept: RADIOLOGY | Facility: HOSPITAL | Age: 61
Discharge: HOME | End: 2021-11-30
Attending: FAMILY MEDICINE
Payer: COMMERCIAL

## 2021-11-30 DIAGNOSIS — R92.8 ABNORMAL MAMMOGRAM OF RIGHT BREAST: ICD-10-CM

## 2021-11-30 PROCEDURE — 76882 US LMTD JT/FCL EVL NVASC XTR: CPT | Mod: TC,PO,RT

## 2021-11-30 PROCEDURE — 19081 BX BREAST 1ST LESION STRTCTC: CPT | Mod: PO,RT

## 2021-11-30 PROCEDURE — 88305 TISSUE EXAM BY PATHOLOGIST: CPT | Mod: 59 | Performed by: PATHOLOGY

## 2021-11-30 PROCEDURE — 76882 US AXILLA ONLY (BREAST IMAGING) RIGHT: ICD-10-PCS | Mod: 26,RT,, | Performed by: RADIOLOGY

## 2021-11-30 PROCEDURE — 19081 BX BREAST 1ST LESION STRTCTC: CPT | Mod: RT,,, | Performed by: RADIOLOGY

## 2021-11-30 PROCEDURE — 88305 TISSUE EXAM BY PATHOLOGIST: ICD-10-PCS | Mod: 26,,, | Performed by: PATHOLOGY

## 2021-11-30 PROCEDURE — 19081 MAMMO BREAST STEREOTACTIC BREAST BIOPSY RIGHT: ICD-10-PCS | Mod: RT,,, | Performed by: RADIOLOGY

## 2021-11-30 PROCEDURE — 76882 US LMTD JT/FCL EVL NVASC XTR: CPT | Mod: 26,RT,, | Performed by: RADIOLOGY

## 2021-11-30 PROCEDURE — 88305 TISSUE EXAM BY PATHOLOGIST: CPT | Mod: 26,,, | Performed by: PATHOLOGY

## 2021-11-30 RX ORDER — LIDOCAINE HYDROCHLORIDE AND EPINEPHRINE 20; 10 MG/ML; UG/ML
20 INJECTION, SOLUTION INFILTRATION; PERINEURAL ONCE
Status: DISCONTINUED | OUTPATIENT
Start: 2021-11-30 | End: 2021-12-01 | Stop reason: HOSPADM

## 2021-11-30 RX ORDER — LIDOCAINE HYDROCHLORIDE 10 MG/ML
5 INJECTION INFILTRATION; PERINEURAL ONCE
Status: DISCONTINUED | OUTPATIENT
Start: 2021-11-30 | End: 2021-12-01 | Stop reason: HOSPADM

## 2021-12-02 LAB
FINAL PATHOLOGIC DIAGNOSIS: NORMAL
GROSS: NORMAL
Lab: NORMAL

## 2021-12-27 RX ORDER — METFORMIN HYDROCHLORIDE 500 MG/1
2000 TABLET, EXTENDED RELEASE ORAL DAILY
Qty: 360 TABLET | Refills: 2 | Status: SHIPPED | OUTPATIENT
Start: 2021-12-27 | End: 2022-09-14 | Stop reason: SDUPTHER

## 2022-01-03 ENCOUNTER — OCCUPATIONAL HEALTH (OUTPATIENT)
Dept: URGENT CARE | Facility: CLINIC | Age: 62
End: 2022-01-03

## 2022-01-03 DIAGNOSIS — R51.9 NONINTRACTABLE HEADACHE, UNSPECIFIED CHRONICITY PATTERN, UNSPECIFIED HEADACHE TYPE: Primary | ICD-10-CM

## 2022-01-03 DIAGNOSIS — U07.1 COVID-19 VIRUS DETECTED: ICD-10-CM

## 2022-01-03 DIAGNOSIS — R51.9 NONINTRACTABLE HEADACHE, UNSPECIFIED CHRONICITY PATTERN, UNSPECIFIED HEADACHE TYPE: ICD-10-CM

## 2022-01-03 LAB
CTP QC/QA: YES
SARS-COV-2 RDRP RESP QL NAA+PROBE: POSITIVE

## 2022-01-03 PROCEDURE — U0002: ICD-10-PCS | Mod: QW,S$GLB,, | Performed by: PREVENTIVE MEDICINE

## 2022-01-03 PROCEDURE — U0002 COVID-19 LAB TEST NON-CDC: HCPCS | Mod: QW,S$GLB,, | Performed by: PREVENTIVE MEDICINE

## 2022-01-03 NOTE — PATIENT INSTRUCTIONS
"You have tested positive for COVID-19 today.           ISOLATION    If you tested positive and you have no symptoms, you must isolate for 5 days starting on the day of the positive test. I         If you tested positive and have symptoms, you must isolate for 5 days starting on the day of the first symptoms,  not the day of the positive test.     This is the most important part, both the CDC and the [facility] emphasize that you do not test out of isolation.     After 5 days, if your symptoms have improved and you have not had fever on day 5, you can return to the community on day 6- NO TESTING REQUIRED!          In fact, we do not retest if you were positive in the last 90 days.       After your 5 days of isolation are completed, the CDC recommends strict mask use for the first 5 days that you come out of isolation.    Patient encouraged to see a provider today for further assessment or treatment options. "Patient Declined/Patient would like a phone call for following"  Patient will/will not be contacted by Provider for following information below   Your test is positive, scientists at [facility] would like to collect another sample from you to SEQUENCE for one of the COVID variants. We wont be able to give you the results, but it will help researchers understand whether vaccines are effective against new strains.     Patient will/will not be contacted by Provider Monoclonal antibody infusion therapy         CDC Testing and Quarantine Guidelines for patients with exposure to a known-positive COVID-19 person:    ·     A 'close exposure' is defined as anyone who has had an exposure (masked or unmasked) to a known COVID -19 positive person            within 6 feet of someone            for a cumulative total of 15 minutes or more over a 24-hour period.    ·     vaccinated and/or had a positive test within 90 days            do NOT need to quarantine after contact with someone who had COVID-19 unless they have " symptoms.            fully vaccinated people who have not had a positive test within 90 days, should get tested 3-5 days after their exposure, even if they don't have symptoms and wear a mask indoors in public for 10 days following exposure or until their test result is negative.         ·     Unvaccinated, or are more than six months out from their second mRNA dose (or more than 2 months after the J&J vaccine) and not yet boosted,  and/or NOT had a positive test within 90 days and meet 'close exposure'    you are required by CDC guidelines to quarantine for at least 5 days from time of exposure followed by 5 days of strict masking. It is recommended, but not required to test after 5 days, unless you develop symptoms, in which case you should test at that time.    If you do decide to test at 5 days and are asymptomatic, the risk is that if you test without symptoms on Day 5 for example) and you are positive, your 5 day isolation begins on that day, and you turned your 5 day quarantine into 10 days.            If your exposure does not meet the above definition, you can return to your normal daily activities to include social distancing, wearing a mask and frequent handwashing.    Alternatively, if a 5-day quarantine is not feasible, it is imperative that an exposed person wear a well-fitting mask at all times when around others for 10 days after exposure

## 2022-01-04 ENCOUNTER — PATIENT MESSAGE (OUTPATIENT)
Dept: INTERNAL MEDICINE | Facility: CLINIC | Age: 62
End: 2022-01-04
Payer: COMMERCIAL

## 2022-01-25 ENCOUNTER — LAB VISIT (OUTPATIENT)
Dept: LAB | Facility: HOSPITAL | Age: 62
End: 2022-01-25
Attending: FAMILY MEDICINE
Payer: COMMERCIAL

## 2022-01-25 DIAGNOSIS — E11.69 COMBINED HYPERLIPIDEMIA ASSOCIATED WITH TYPE 2 DIABETES MELLITUS: ICD-10-CM

## 2022-01-25 DIAGNOSIS — E78.2 COMBINED HYPERLIPIDEMIA ASSOCIATED WITH TYPE 2 DIABETES MELLITUS: ICD-10-CM

## 2022-01-25 DIAGNOSIS — I15.2 HYPERTENSION ASSOCIATED WITH DIABETES: ICD-10-CM

## 2022-01-25 DIAGNOSIS — E11.59 HYPERTENSION ASSOCIATED WITH DIABETES: ICD-10-CM

## 2022-01-25 DIAGNOSIS — K76.0 FATTY LIVER: ICD-10-CM

## 2022-01-25 DIAGNOSIS — Z78.9 STATIN INTOLERANCE: ICD-10-CM

## 2022-01-25 DIAGNOSIS — Z79.899 ENCOUNTER FOR LONG-TERM (CURRENT) USE OF MEDICATIONS: ICD-10-CM

## 2022-01-25 LAB
ALBUMIN SERPL BCP-MCNC: 4.2 G/DL (ref 3.5–5.2)
ALP SERPL-CCNC: 101 U/L (ref 55–135)
ALT SERPL W/O P-5'-P-CCNC: 90 U/L (ref 10–44)
ANION GAP SERPL CALC-SCNC: 13 MMOL/L (ref 8–16)
AST SERPL-CCNC: 54 U/L (ref 10–40)
BILIRUB SERPL-MCNC: 0.4 MG/DL (ref 0.1–1)
BUN SERPL-MCNC: 17 MG/DL (ref 8–23)
CALCIUM SERPL-MCNC: 9.6 MG/DL (ref 8.7–10.5)
CHLORIDE SERPL-SCNC: 104 MMOL/L (ref 95–110)
CHOLEST SERPL-MCNC: 193 MG/DL (ref 120–199)
CHOLEST/HDLC SERPL: 4 {RATIO} (ref 2–5)
CO2 SERPL-SCNC: 21 MMOL/L (ref 23–29)
CREAT SERPL-MCNC: 0.8 MG/DL (ref 0.5–1.4)
EST. GFR  (AFRICAN AMERICAN): >60 ML/MIN/1.73 M^2
EST. GFR  (NON AFRICAN AMERICAN): >60 ML/MIN/1.73 M^2
ESTIMATED AVG GLUCOSE: 160 MG/DL (ref 68–131)
GLUCOSE SERPL-MCNC: 134 MG/DL (ref 70–110)
HBA1C MFR BLD: 7.2 % (ref 4–5.6)
HDLC SERPL-MCNC: 48 MG/DL (ref 40–75)
HDLC SERPL: 24.9 % (ref 20–50)
LDLC SERPL CALC-MCNC: 113.8 MG/DL (ref 63–159)
NONHDLC SERPL-MCNC: 145 MG/DL
POTASSIUM SERPL-SCNC: 4.4 MMOL/L (ref 3.5–5.1)
PROT SERPL-MCNC: 7.8 G/DL (ref 6–8.4)
SODIUM SERPL-SCNC: 138 MMOL/L (ref 136–145)
TRIGL SERPL-MCNC: 156 MG/DL (ref 30–150)
VIT B12 SERPL-MCNC: 401 PG/ML (ref 210–950)

## 2022-01-25 PROCEDURE — 80061 LIPID PANEL: CPT | Performed by: FAMILY MEDICINE

## 2022-01-25 PROCEDURE — 36415 COLL VENOUS BLD VENIPUNCTURE: CPT | Mod: PO | Performed by: FAMILY MEDICINE

## 2022-01-25 PROCEDURE — 83036 HEMOGLOBIN GLYCOSYLATED A1C: CPT | Performed by: FAMILY MEDICINE

## 2022-01-25 PROCEDURE — 82607 VITAMIN B-12: CPT | Performed by: FAMILY MEDICINE

## 2022-01-25 PROCEDURE — 80053 COMPREHEN METABOLIC PANEL: CPT | Performed by: FAMILY MEDICINE

## 2022-04-26 ENCOUNTER — PATIENT MESSAGE (OUTPATIENT)
Dept: ADMINISTRATIVE | Facility: HOSPITAL | Age: 62
End: 2022-04-26
Payer: COMMERCIAL

## 2022-05-02 ENCOUNTER — PATIENT MESSAGE (OUTPATIENT)
Dept: ADMINISTRATIVE | Facility: HOSPITAL | Age: 62
End: 2022-05-02
Payer: COMMERCIAL

## 2022-06-07 ENCOUNTER — OFFICE VISIT (OUTPATIENT)
Dept: FAMILY MEDICINE | Facility: CLINIC | Age: 62
End: 2022-06-07
Payer: COMMERCIAL

## 2022-06-07 VITALS
TEMPERATURE: 98 F | DIASTOLIC BLOOD PRESSURE: 75 MMHG | HEART RATE: 94 BPM | WEIGHT: 176 LBS | BODY MASS INDEX: 27.62 KG/M2 | HEIGHT: 67 IN | SYSTOLIC BLOOD PRESSURE: 138 MMHG

## 2022-06-07 DIAGNOSIS — Z01.419 WELL WOMAN EXAM WITH ROUTINE GYNECOLOGICAL EXAM: Primary | ICD-10-CM

## 2022-06-07 DIAGNOSIS — K64.9 HEMORRHOIDS, UNSPECIFIED HEMORRHOID TYPE: ICD-10-CM

## 2022-06-07 PROCEDURE — 3008F PR BODY MASS INDEX (BMI) DOCUMENTED: ICD-10-PCS | Mod: CPTII,S$GLB,, | Performed by: NURSE PRACTITIONER

## 2022-06-07 PROCEDURE — 3008F BODY MASS INDEX DOCD: CPT | Mod: CPTII,S$GLB,, | Performed by: NURSE PRACTITIONER

## 2022-06-07 PROCEDURE — 87624 HPV HI-RISK TYP POOLED RSLT: CPT | Performed by: NURSE PRACTITIONER

## 2022-06-07 PROCEDURE — 99999 PR PBB SHADOW E&M-EST. PATIENT-LVL IV: ICD-10-PCS | Mod: PBBFAC,,, | Performed by: NURSE PRACTITIONER

## 2022-06-07 PROCEDURE — 3075F PR MOST RECENT SYSTOLIC BLOOD PRESS GE 130-139MM HG: ICD-10-PCS | Mod: CPTII,S$GLB,, | Performed by: NURSE PRACTITIONER

## 2022-06-07 PROCEDURE — 1159F PR MEDICATION LIST DOCUMENTED IN MEDICAL RECORD: ICD-10-PCS | Mod: CPTII,S$GLB,, | Performed by: NURSE PRACTITIONER

## 2022-06-07 PROCEDURE — 3078F PR MOST RECENT DIASTOLIC BLOOD PRESSURE < 80 MM HG: ICD-10-PCS | Mod: CPTII,S$GLB,, | Performed by: NURSE PRACTITIONER

## 2022-06-07 PROCEDURE — 3078F DIAST BP <80 MM HG: CPT | Mod: CPTII,S$GLB,, | Performed by: NURSE PRACTITIONER

## 2022-06-07 PROCEDURE — 1160F RVW MEDS BY RX/DR IN RCRD: CPT | Mod: CPTII,S$GLB,, | Performed by: NURSE PRACTITIONER

## 2022-06-07 PROCEDURE — 1159F MED LIST DOCD IN RCRD: CPT | Mod: CPTII,S$GLB,, | Performed by: NURSE PRACTITIONER

## 2022-06-07 PROCEDURE — 3051F PR MOST RECENT HEMOGLOBIN A1C LEVEL 7.0 - < 8.0%: ICD-10-PCS | Mod: CPTII,S$GLB,, | Performed by: NURSE PRACTITIONER

## 2022-06-07 PROCEDURE — 3051F HG A1C>EQUAL 7.0%<8.0%: CPT | Mod: CPTII,S$GLB,, | Performed by: NURSE PRACTITIONER

## 2022-06-07 PROCEDURE — 99214 PR OFFICE/OUTPT VISIT, EST, LEVL IV, 30-39 MIN: ICD-10-PCS | Mod: S$GLB,,, | Performed by: NURSE PRACTITIONER

## 2022-06-07 PROCEDURE — 3075F SYST BP GE 130 - 139MM HG: CPT | Mod: CPTII,S$GLB,, | Performed by: NURSE PRACTITIONER

## 2022-06-07 PROCEDURE — 99999 PR PBB SHADOW E&M-EST. PATIENT-LVL IV: CPT | Mod: PBBFAC,,, | Performed by: NURSE PRACTITIONER

## 2022-06-07 PROCEDURE — 99214 OFFICE O/P EST MOD 30 MIN: CPT | Mod: S$GLB,,, | Performed by: NURSE PRACTITIONER

## 2022-06-07 PROCEDURE — 1160F PR REVIEW ALL MEDS BY PRESCRIBER/CLIN PHARMACIST DOCUMENTED: ICD-10-PCS | Mod: CPTII,S$GLB,, | Performed by: NURSE PRACTITIONER

## 2022-06-07 PROCEDURE — 88175 CYTOPATH C/V AUTO FLUID REDO: CPT | Performed by: NURSE PRACTITIONER

## 2022-06-07 NOTE — PROGRESS NOTES
"Subjective:       Patient ID: Marlene Carrillo is a 62 y.o. female.    Chief Complaint: No chief complaint on file.    Gynecologic Exam  The patient's pertinent negatives include no genital itching, genital lesions, genital odor, genital rash, missed menses, pelvic pain, vaginal bleeding or vaginal discharge. Primary symptoms comment: Pt in today for routine well woman exam. The patient is experiencing no pain. She is not pregnant. Pertinent negatives include no abdominal pain, anorexia, back pain, chills, constipation, diarrhea, discolored urine, dysuria, fever, flank pain, frequency, headaches, hematuria, joint pain, joint swelling, nausea, painful intercourse, rash, sore throat, urgency or vomiting. She is sexually active. No, her partner does not have an STD. She uses nothing for contraception. She is postmenopausal. Her past medical history is significant for a gynecological surgery (bladder suspension). There is no history of an abdominal surgery, a  section, an ectopic pregnancy, endometriosis, herpes simplex, menorrhagia, metrorrhagia, miscarriage, ovarian cysts, perineal abscess, PID, an STD, a terminated pregnancy or vaginosis.   Pt declines general surgery, treatment for hemorrhoids; states,"they're not bothering me."  Past Medical History:   Diagnosis Date    Abnormal Pap smear     ALLERGIC RHINITIS     Diabetes mellitus     Diverticulosis of colon (without mention of hemorrhage)     Fatty liver 2021    GERD (gastroesophageal reflux disease)     Heart murmur     Hyperlipidemia LDL goal < 100     Hypertension     Insomnia     Moderate cervical dysplasia      Social History     Socioeconomic History    Marital status:    Tobacco Use    Smoking status: Former Smoker     Packs/day: 0.00     Years: 0.00     Pack years: 0.00     Quit date: 3/8/1995     Years since quittin.2    Smokeless tobacco: Never Used   Substance and Sexual Activity    Alcohol use: No    Drug use: " No    Sexual activity: Never     Past Surgical History:   Procedure Laterality Date    BREAST BIOPSY      Cold knife conization      COLONOSCOPY  8/9/2012         INCONTINENCE SURGERY      TVT-O    Tonsillectomy      TONSILLECTOMY         Review of Systems   Constitutional: Negative.  Negative for chills and fever.   HENT: Negative.  Negative for sore throat.    Eyes: Negative.    Respiratory: Negative.    Cardiovascular: Negative.    Gastrointestinal: Negative.  Negative for abdominal pain, anorexia, constipation, diarrhea, nausea and vomiting.   Endocrine: Negative.    Genitourinary: Negative.  Negative for dysuria, flank pain, frequency, hematuria, menorrhagia, missed menses, pelvic pain, urgency and vaginal discharge.   Musculoskeletal: Negative.  Negative for back pain and joint pain.   Integumentary:  Negative for rash. Negative.   Allergic/Immunologic: Negative.    Neurological: Negative.  Negative for headaches.   Psychiatric/Behavioral: Negative.          Objective:      Physical Exam  Vitals and nursing note reviewed.   Constitutional:       Appearance: Normal appearance.   HENT:      Head: Normocephalic.      Right Ear: Tympanic membrane, ear canal and external ear normal.      Left Ear: Tympanic membrane, ear canal and external ear normal.      Nose: Nose normal.      Mouth/Throat:      Mouth: Mucous membranes are moist.      Pharynx: Oropharynx is clear.   Eyes:      Conjunctiva/sclera: Conjunctivae normal.      Pupils: Pupils are equal, round, and reactive to light.   Cardiovascular:      Rate and Rhythm: Normal rate and regular rhythm.      Pulses: Normal pulses.      Heart sounds: Normal heart sounds.   Pulmonary:      Effort: Pulmonary effort is normal.      Breath sounds: Normal breath sounds.   Chest:   Breasts:      Right: No swelling, bleeding, inverted nipple, mass, nipple discharge, skin change or tenderness.      Left: No swelling, bleeding, inverted nipple, mass, nipple discharge,  skin change or tenderness.       Abdominal:      General: Bowel sounds are normal.      Palpations: Abdomen is soft.      Hernia: A hernia is present. There is no hernia in the left inguinal area or right inguinal area.   Genitourinary:     General: Normal vulva.      Exam position: Lithotomy position.      Pubic Area: No rash or pubic lice.       Labia:         Right: No rash, tenderness, lesion or injury.         Left: No rash, tenderness, lesion or injury.       Urethra: No prolapse, urethral pain, urethral swelling or urethral lesion.      Vagina: Normal.      Cervix: Normal.      Uterus: Normal.       Adnexa: Right adnexa normal and left adnexa normal.      Rectum: Guaiac result negative. External hemorrhoid (x 4 (1 large, 3 small)) present. No mass, tenderness, anal fissure or internal hemorrhoid. Normal anal tone.   Musculoskeletal:         General: Normal range of motion.      Cervical back: Normal range of motion and neck supple.   Lymphadenopathy:      Lower Body: No right inguinal adenopathy. No left inguinal adenopathy.   Skin:     General: Skin is warm and dry.      Capillary Refill: Capillary refill takes 2 to 3 seconds.   Neurological:      Mental Status: She is alert and oriented to person, place, and time.   Psychiatric:         Mood and Affect: Mood normal.         Behavior: Behavior normal.         Thought Content: Thought content normal.         Judgment: Judgment normal.         Assessment:       Problem List Items Addressed This Visit    None     Visit Diagnoses     Well woman exam with routine gynecological exam    -  Primary    Hemorrhoids, unspecified hemorrhoid type      Relevant Orders    Liquid-Based Pap Smear, Screening    HPV High Risk Genotypes, PCR          Plan:           Diagnoses and all orders for this visit:    Well woman exam with routine gynecological exam  Hemorrhoids, unspecified hemorrhoid type  -     Liquid-Based Pap Smear, Screening  -     HPV High Risk Genotypes,  PCR  Mammogram UTD  Monitor hemorrhoids

## 2022-06-12 LAB
FINAL PATHOLOGIC DIAGNOSIS: NORMAL
Lab: NORMAL

## 2022-06-14 LAB
HPV HR 12 DNA SPEC QL NAA+PROBE: NEGATIVE
HPV16 AG SPEC QL: NEGATIVE
HPV18 DNA SPEC QL NAA+PROBE: NEGATIVE

## 2022-07-12 ENCOUNTER — LAB VISIT (OUTPATIENT)
Dept: LAB | Facility: HOSPITAL | Age: 62
End: 2022-07-12
Attending: FAMILY MEDICINE
Payer: COMMERCIAL

## 2022-07-12 DIAGNOSIS — I15.2 HYPERTENSION ASSOCIATED WITH DIABETES: ICD-10-CM

## 2022-07-12 DIAGNOSIS — E11.59 HYPERTENSION ASSOCIATED WITH DIABETES: ICD-10-CM

## 2022-07-12 LAB
ESTIMATED AVG GLUCOSE: 148 MG/DL (ref 68–131)
HBA1C MFR BLD: 6.8 % (ref 4–5.6)

## 2022-07-12 PROCEDURE — 83036 HEMOGLOBIN GLYCOSYLATED A1C: CPT | Performed by: FAMILY MEDICINE

## 2022-07-12 PROCEDURE — 36415 COLL VENOUS BLD VENIPUNCTURE: CPT | Mod: PO | Performed by: FAMILY MEDICINE

## 2022-07-22 ENCOUNTER — OFFICE VISIT (OUTPATIENT)
Dept: FAMILY MEDICINE | Facility: CLINIC | Age: 62
End: 2022-07-22
Payer: COMMERCIAL

## 2022-07-22 VITALS
HEIGHT: 67 IN | TEMPERATURE: 98 F | HEART RATE: 105 BPM | BODY MASS INDEX: 27.12 KG/M2 | WEIGHT: 172.81 LBS | SYSTOLIC BLOOD PRESSURE: 137 MMHG | DIASTOLIC BLOOD PRESSURE: 82 MMHG

## 2022-07-22 DIAGNOSIS — E11.59 HYPERTENSION ASSOCIATED WITH DIABETES: ICD-10-CM

## 2022-07-22 DIAGNOSIS — U07.1 COVID-19: Primary | ICD-10-CM

## 2022-07-22 DIAGNOSIS — I15.2 HYPERTENSION ASSOCIATED WITH DIABETES: ICD-10-CM

## 2022-07-22 LAB
CTP QC/QA: YES
SARS-COV-2 RDRP RESP QL NAA+PROBE: POSITIVE

## 2022-07-22 PROCEDURE — 99214 PR OFFICE/OUTPT VISIT, EST, LEVL IV, 30-39 MIN: ICD-10-PCS | Mod: S$GLB,,, | Performed by: FAMILY MEDICINE

## 2022-07-22 PROCEDURE — 99214 OFFICE O/P EST MOD 30 MIN: CPT | Mod: S$GLB,,, | Performed by: FAMILY MEDICINE

## 2022-07-22 PROCEDURE — 3075F PR MOST RECENT SYSTOLIC BLOOD PRESS GE 130-139MM HG: ICD-10-PCS | Mod: CPTII,S$GLB,, | Performed by: FAMILY MEDICINE

## 2022-07-22 PROCEDURE — U0002: ICD-10-PCS | Mod: QW,S$GLB,, | Performed by: FAMILY MEDICINE

## 2022-07-22 PROCEDURE — 3079F PR MOST RECENT DIASTOLIC BLOOD PRESSURE 80-89 MM HG: ICD-10-PCS | Mod: CPTII,S$GLB,, | Performed by: FAMILY MEDICINE

## 2022-07-22 PROCEDURE — 3008F BODY MASS INDEX DOCD: CPT | Mod: CPTII,S$GLB,, | Performed by: FAMILY MEDICINE

## 2022-07-22 PROCEDURE — 99999 PR PBB SHADOW E&M-EST. PATIENT-LVL IV: CPT | Mod: PBBFAC,,, | Performed by: FAMILY MEDICINE

## 2022-07-22 PROCEDURE — 3008F PR BODY MASS INDEX (BMI) DOCUMENTED: ICD-10-PCS | Mod: CPTII,S$GLB,, | Performed by: FAMILY MEDICINE

## 2022-07-22 PROCEDURE — 3044F PR MOST RECENT HEMOGLOBIN A1C LEVEL <7.0%: ICD-10-PCS | Mod: CPTII,S$GLB,, | Performed by: FAMILY MEDICINE

## 2022-07-22 PROCEDURE — 1159F MED LIST DOCD IN RCRD: CPT | Mod: CPTII,S$GLB,, | Performed by: FAMILY MEDICINE

## 2022-07-22 PROCEDURE — 3075F SYST BP GE 130 - 139MM HG: CPT | Mod: CPTII,S$GLB,, | Performed by: FAMILY MEDICINE

## 2022-07-22 PROCEDURE — U0002 COVID-19 LAB TEST NON-CDC: HCPCS | Mod: QW,S$GLB,, | Performed by: FAMILY MEDICINE

## 2022-07-22 PROCEDURE — 3079F DIAST BP 80-89 MM HG: CPT | Mod: CPTII,S$GLB,, | Performed by: FAMILY MEDICINE

## 2022-07-22 PROCEDURE — 3044F HG A1C LEVEL LT 7.0%: CPT | Mod: CPTII,S$GLB,, | Performed by: FAMILY MEDICINE

## 2022-07-22 PROCEDURE — 99999 PR PBB SHADOW E&M-EST. PATIENT-LVL IV: ICD-10-PCS | Mod: PBBFAC,,, | Performed by: FAMILY MEDICINE

## 2022-07-22 PROCEDURE — 1159F PR MEDICATION LIST DOCUMENTED IN MEDICAL RECORD: ICD-10-PCS | Mod: CPTII,S$GLB,, | Performed by: FAMILY MEDICINE

## 2022-07-22 RX ORDER — CODEINE PHOSPHATE AND GUAIFENESIN 10; 100 MG/5ML; MG/5ML
SOLUTION ORAL
Qty: 118 ML | Refills: 0 | Status: SHIPPED | OUTPATIENT
Start: 2022-07-22 | End: 2023-05-02

## 2022-07-22 NOTE — PROGRESS NOTES
Patient complains of sore throat, congestion, postnasal drainage, slight cough, no fever.  Symptoms started over the past night before last.  States negative COVID test yesterday..  Current treatment over-the-counter medication.    In addition recent laboratory reviewed.  Diabetes controlled.  Using metformin only.  Blood pressure controlled.    Diabetes Management Status    Statin: Not taking  ACE/ARB: Not taking    Screening or Prevention Patient's value Goal Complete/Controlled?   HgA1C Testing and Control   Lab Results   Component Value Date    HGBA1C 6.8 (H) 07/12/2022      Annually/Less than 8% Yes   Lipid profile : 01/25/2022 Annually Yes   LDL control Lab Results   Component Value Date    LDLCALC 113.8 01/25/2022    Annually/Less than 100 mg/dl  No   Nephropathy screening Lab Results   Component Value Date    LABMICR 10.0 11/17/2021     Lab Results   Component Value Date    PROTEINUA Negative 03/08/2012     No results found for: UTPCR   Annually Yes   Blood pressure BP Readings from Last 1 Encounters:   07/22/22 137/82    Less than 140/90 Yes   Dilated retinal exam : 11/19/2020 Annually No   Foot exam   : 11/17/2021 Annually Yes           Marlene was seen today for cough, nasal congestion and headache.    Diagnoses and all orders for this visit:    COVID-19  -     POCT COVID-19 Rapid Screening; Future  -     POCT COVID-19 Rapid Screening    Hypertension associated with diabetes  -     Lipid Panel; Future  -     Comprehensive Metabolic Panel; Future  -     Hemoglobin A1C; Future  -     Microalbumin/Creatinine Ratio, Urine; Future  -     Vitamin B12; Future    Other orders  -     guaiFENesin-codeine 100-10 mg/5 ml (TUSSI-ORGANIDIN NR)  mg/5 mL syrup; Take 5-10 ml po every 6 hours as needed for cough  -     COVID-19 Home Symptom Monitoring  - Duration (days): 14      COVID testing is positive.  Patient declines antiviral therapy.  Follow-up as needed if symptoms not improving with Recommended treatment  next few days  Arrange repeat laboratory above in January.    Past Medical History:  Past Medical History:   Diagnosis Date    Abnormal Pap smear     ALLERGIC RHINITIS     Diabetes mellitus     Diverticulosis of colon (without mention of hemorrhage)     Fatty liver 2021    GERD (gastroesophageal reflux disease)     Heart murmur     Hyperlipidemia LDL goal < 100     Hypertension     Insomnia     Moderate cervical dysplasia      Past Surgical History:   Procedure Laterality Date    BREAST BIOPSY      Cold knife conization      COLONOSCOPY  2012         INCONTINENCE SURGERY      TVT-O    Tonsillectomy      TONSILLECTOMY       Social History     Socioeconomic History    Marital status:    Tobacco Use    Smoking status: Former Smoker     Packs/day: 0.00     Years: 0.00     Pack years: 0.00     Quit date: 3/8/1995     Years since quittin.3    Smokeless tobacco: Never Used   Substance and Sexual Activity    Alcohol use: No    Drug use: No    Sexual activity: Never     Family History   Problem Relation Age of Onset    Cancer Mother     Diabetes Mother     Hypertension Mother     Depression Brother     Skin cancer Brother     HIV Brother     Cancer Brother     Diabetes Maternal Grandmother     Stroke Maternal Grandmother     Heart disease Maternal Grandfather     Stroke Paternal Grandmother     Breast cancer Maternal Aunt     Lymphoma Brother     Diabetes Brother     Hypertension Brother     Hyperlipidemia Brother     Cancer Brother     Ovarian cancer Neg Hx      Review of patient's allergies indicates:   Allergen Reactions    Corgard  [nadolol] Other (See Comments)    Fiorinal  [butalbital-aspirin-caffeine] Other (See Comments)    Percodan [oxycodone hcl-oxycodone-asa] Other (See Comments)    Pravastatin Other (See Comments)     Elevated liver enzymes     Current Outpatient Medications on File Prior to Visit   Medication Sig Dispense Refill    aspirin  "(ECOTRIN) 81 MG EC tablet Take 1 tablet (81 mg total) by mouth once daily. (Patient taking differently: Take 81 mg by mouth every other day.)      cetirizine (ZYRTEC) 10 MG tablet Take 1 tablet (10 mg total) by mouth once daily. 30 tablet 11    cyanocobalamin (VITAMIN B-12) 100 MCG tablet Take 100 mcg by mouth once daily.      esomeprazole (NEXIUM) 20 MG capsule Take 20 mg by mouth before breakfast.      fluticasone propionate (FLONASE) 50 mcg/actuation nasal spray Instill 2 sprays (100 mcg total) in each nostril once daily. (Patient taking differently: 2 sprays by Each Nostril route as needed.) 48 g 3    metFORMIN (GLUCOPHAGE-XR) 500 MG ER 24hr tablet Take 4 tablets (2,000 mg total) by mouth once daily. 360 tablet 2    multivitamin capsule Take 1 capsule by mouth once daily.      RED YEAST RICE ORAL Take by mouth once daily.      ubidecarenone (COENZYME Q10) 100 mg Tab Take 1 tablet by mouth once daily.      UNABLE TO FIND medication name: Tumeric daily      vitamin D (VITAMIN D3) 1000 units Tab Take 1,000 Units by mouth once daily.      A/b-carotene/C/biofl/B6/mn/Ech (ECHINACEA C COMPLETE ORAL) Take 450 mg by mouth 2 (two) times daily.      BLOOD SUGAR DIAGNOSTIC (BLOOD GLUCOSE TEST MISC) No Sig Provided      dulaglutide (TRULICITY) 0.75 mg/0.5 mL pen injector Inject 1 syringe (0.5 mLs total) into the skin every 7 days. (Patient not taking: No sig reported) 12 pen 3    milk thistle 175 mg tablet Take 250 mg by mouth once daily.       No current facility-administered medications on file prior to visit.         Physical Exam:  Vitals:    07/22/22 1446   BP: 137/82   Pulse: 105   Temp: 97.5 °F (36.4 °C)   TempSrc: Temporal   Weight: 78.4 kg (172 lb 12.8 oz)   Height: 5' 7" (1.702 m)       Gen.: No acute distress  HEENT: sinuses nontender. Ears: Tympanic membranes intact.  No erythema or effusion.  Nose mild congestion.  Throat mild erythema no exudate.  Mild postnasal drainage.  Neck supple no " adenopathy  Chest: Clear to auscultation.  Normal respiratory effort.

## 2022-09-14 RX ORDER — METFORMIN HYDROCHLORIDE 500 MG/1
2000 TABLET, EXTENDED RELEASE ORAL DAILY
Qty: 360 TABLET | Refills: 1 | Status: SHIPPED | OUTPATIENT
Start: 2022-09-14 | End: 2023-03-16 | Stop reason: SDUPTHER

## 2022-09-14 NOTE — TELEPHONE ENCOUNTER
No new care gaps identified.  [facility] Embedded Care Gaps. Reference number: [id number]. 9/14/2022   5:08:25 AM CDT

## 2022-10-18 ENCOUNTER — PATIENT MESSAGE (OUTPATIENT)
Dept: ADMINISTRATIVE | Facility: HOSPITAL | Age: 62
End: 2022-10-18
Payer: COMMERCIAL

## 2023-01-11 ENCOUNTER — PATIENT OUTREACH (OUTPATIENT)
Dept: ADMINISTRATIVE | Facility: HOSPITAL | Age: 63
End: 2023-01-11
Payer: COMMERCIAL

## 2023-01-25 ENCOUNTER — PATIENT MESSAGE (OUTPATIENT)
Dept: ADMINISTRATIVE | Facility: HOSPITAL | Age: 63
End: 2023-01-25
Payer: COMMERCIAL

## 2023-03-16 RX ORDER — METFORMIN HYDROCHLORIDE 500 MG/1
2000 TABLET, EXTENDED RELEASE ORAL DAILY
Qty: 360 TABLET | Refills: 0 | Status: SHIPPED | OUTPATIENT
Start: 2023-03-16 | End: 2023-06-11 | Stop reason: SDUPTHER

## 2023-03-16 NOTE — TELEPHONE ENCOUNTER
Refill Routing Note   Refill Routing Note   Medication(s) are not appropriate for processing by [facility] for the following reason(s):       Required labs outdated    [facility] action(s):  Defer   Requires labs : Yes    Medication Therapy Plan: CR, A1C    Appointments  past 12m or future 3m with PCP    Date Provider   Last Visit   7/22/2022 Leonardo Pham MD   Next Visit   Visit date not found Leonardo Pham MD   ED visits in past 90 days: 0        Note composed:11:58 AM 03/16/2023

## 2023-03-16 NOTE — TELEPHONE ENCOUNTER
Care Due:                  Date            Visit Type   Department     Provider  --------------------------------------------------------------------------------                                MYCHART                              FOLLOWUP/OF  [facility] FAMILY  Last Visit: 07-      [facility]ICE VISIT   MEDICINE       Leoanrdo Pham  Next Visit: None Scheduled  None         None Found                                                            Last  Test          Frequency    Reason                     Performed    Due Date  --------------------------------------------------------------------------------    Cr..........  12 months..  metFORMIN................  01- 01-    HBA1C.......  6 months...  dulaglutide, metFORMIN...  07- 01-    [facility] Embedded Care Gaps. Reference number: [id number]. 3/16/2023   5:08:24 AM CDT

## 2023-04-10 ENCOUNTER — PATIENT OUTREACH (OUTPATIENT)
Dept: ADMINISTRATIVE | Facility: HOSPITAL | Age: 63
End: 2023-04-10
Payer: COMMERCIAL

## 2023-04-18 ENCOUNTER — LAB VISIT (OUTPATIENT)
Dept: LAB | Facility: HOSPITAL | Age: 63
End: 2023-04-18
Attending: FAMILY MEDICINE
Payer: COMMERCIAL

## 2023-04-18 ENCOUNTER — PATIENT MESSAGE (OUTPATIENT)
Dept: ADMINISTRATIVE | Facility: HOSPITAL | Age: 63
End: 2023-04-18
Payer: COMMERCIAL

## 2023-04-18 DIAGNOSIS — E11.69 COMBINED HYPERLIPIDEMIA ASSOCIATED WITH TYPE 2 DIABETES MELLITUS: ICD-10-CM

## 2023-04-18 DIAGNOSIS — I15.2 HYPERTENSION ASSOCIATED WITH DIABETES: ICD-10-CM

## 2023-04-18 DIAGNOSIS — E11.59 HYPERTENSION ASSOCIATED WITH DIABETES: ICD-10-CM

## 2023-04-18 DIAGNOSIS — E78.2 COMBINED HYPERLIPIDEMIA ASSOCIATED WITH TYPE 2 DIABETES MELLITUS: ICD-10-CM

## 2023-04-18 LAB
ALBUMIN SERPL BCP-MCNC: 4 G/DL (ref 3.5–5.2)
ALP SERPL-CCNC: 105 U/L (ref 55–135)
ALT SERPL W/O P-5'-P-CCNC: 59 U/L (ref 10–44)
ANION GAP SERPL CALC-SCNC: 11 MMOL/L (ref 8–16)
AST SERPL-CCNC: 35 U/L (ref 10–40)
BILIRUB SERPL-MCNC: 0.3 MG/DL (ref 0.1–1)
BUN SERPL-MCNC: 17 MG/DL (ref 8–23)
CALCIUM SERPL-MCNC: 9 MG/DL (ref 8.7–10.5)
CHLORIDE SERPL-SCNC: 105 MMOL/L (ref 95–110)
CHOLEST SERPL-MCNC: 188 MG/DL (ref 120–199)
CHOLEST/HDLC SERPL: 4.4 {RATIO} (ref 2–5)
CO2 SERPL-SCNC: 23 MMOL/L (ref 23–29)
CREAT SERPL-MCNC: 0.8 MG/DL (ref 0.5–1.4)
EST. GFR  (NO RACE VARIABLE): >60 ML/MIN/1.73 M^2
ESTIMATED AVG GLUCOSE: 189 MG/DL (ref 68–131)
GLUCOSE SERPL-MCNC: 161 MG/DL (ref 70–110)
HBA1C MFR BLD: 8.2 % (ref 4–5.6)
HDLC SERPL-MCNC: 43 MG/DL (ref 40–75)
HDLC SERPL: 22.9 % (ref 20–50)
LDLC SERPL CALC-MCNC: 121 MG/DL (ref 63–159)
NONHDLC SERPL-MCNC: 145 MG/DL
POTASSIUM SERPL-SCNC: 4.3 MMOL/L (ref 3.5–5.1)
PROT SERPL-MCNC: 7.1 G/DL (ref 6–8.4)
SODIUM SERPL-SCNC: 139 MMOL/L (ref 136–145)
TRIGL SERPL-MCNC: 120 MG/DL (ref 30–150)
VIT B12 SERPL-MCNC: 354 PG/ML (ref 210–950)

## 2023-04-18 PROCEDURE — 83036 HEMOGLOBIN GLYCOSYLATED A1C: CPT | Performed by: FAMILY MEDICINE

## 2023-04-18 PROCEDURE — 36415 COLL VENOUS BLD VENIPUNCTURE: CPT | Mod: PO | Performed by: FAMILY MEDICINE

## 2023-04-18 PROCEDURE — 80053 COMPREHEN METABOLIC PANEL: CPT | Performed by: FAMILY MEDICINE

## 2023-04-18 PROCEDURE — 80061 LIPID PANEL: CPT | Performed by: FAMILY MEDICINE

## 2023-04-18 PROCEDURE — 82607 VITAMIN B-12: CPT | Performed by: FAMILY MEDICINE

## 2023-04-19 ENCOUNTER — PATIENT MESSAGE (OUTPATIENT)
Dept: ADMINISTRATIVE | Facility: HOSPITAL | Age: 63
End: 2023-04-19
Payer: COMMERCIAL

## 2023-04-24 ENCOUNTER — TELEPHONE (OUTPATIENT)
Dept: FAMILY MEDICINE | Facility: CLINIC | Age: 63
End: 2023-04-24
Payer: COMMERCIAL

## 2023-04-24 NOTE — TELEPHONE ENCOUNTER
----- Message from Joanna Wray sent at 4/24/2023  4:51 PM CDT -----  Type:  Patient Returning Call    Who Called:pt  Who Left Message for Patient:Lynsey  Does the patient know what this is regarding?:no  Would the patient rather a call back or a response via [facility]? Call  Best Call Back Number:[phone]  Additional Information:

## 2023-05-02 ENCOUNTER — OFFICE VISIT (OUTPATIENT)
Dept: FAMILY MEDICINE | Facility: CLINIC | Age: 63
End: 2023-05-02
Payer: COMMERCIAL

## 2023-05-02 VITALS
TEMPERATURE: 98 F | DIASTOLIC BLOOD PRESSURE: 84 MMHG | BODY MASS INDEX: 29.27 KG/M2 | SYSTOLIC BLOOD PRESSURE: 162 MMHG | HEART RATE: 89 BPM | HEIGHT: 67 IN | WEIGHT: 186.5 LBS

## 2023-05-02 DIAGNOSIS — E11.59 HYPERTENSION ASSOCIATED WITH DIABETES: ICD-10-CM

## 2023-05-02 DIAGNOSIS — K76.0 FATTY LIVER: ICD-10-CM

## 2023-05-02 DIAGNOSIS — E11.69 COMBINED HYPERLIPIDEMIA ASSOCIATED WITH TYPE 2 DIABETES MELLITUS: ICD-10-CM

## 2023-05-02 DIAGNOSIS — E11.65 TYPE 2 DIABETES MELLITUS WITH HYPERGLYCEMIA, WITHOUT LONG-TERM CURRENT USE OF INSULIN: ICD-10-CM

## 2023-05-02 DIAGNOSIS — Z00.00 ROUTINE HISTORY AND PHYSICAL EXAMINATION OF ADULT: Primary | ICD-10-CM

## 2023-05-02 DIAGNOSIS — Z12.11 SCREENING FOR COLON CANCER: ICD-10-CM

## 2023-05-02 DIAGNOSIS — Z12.31 ENCOUNTER FOR SCREENING MAMMOGRAM FOR MALIGNANT NEOPLASM OF BREAST: ICD-10-CM

## 2023-05-02 DIAGNOSIS — E78.2 COMBINED HYPERLIPIDEMIA ASSOCIATED WITH TYPE 2 DIABETES MELLITUS: ICD-10-CM

## 2023-05-02 DIAGNOSIS — I15.2 HYPERTENSION ASSOCIATED WITH DIABETES: ICD-10-CM

## 2023-05-02 DIAGNOSIS — Z78.9 STATIN INTOLERANCE: ICD-10-CM

## 2023-05-02 PROCEDURE — 3008F PR BODY MASS INDEX (BMI) DOCUMENTED: ICD-10-PCS | Mod: CPTII,S$GLB,, | Performed by: FAMILY MEDICINE

## 2023-05-02 PROCEDURE — 3079F DIAST BP 80-89 MM HG: CPT | Mod: CPTII,S$GLB,, | Performed by: FAMILY MEDICINE

## 2023-05-02 PROCEDURE — 99999 PR PBB SHADOW E&M-EST. PATIENT-LVL V: ICD-10-PCS | Mod: PBBFAC,,, | Performed by: FAMILY MEDICINE

## 2023-05-02 PROCEDURE — 99999 PR PBB SHADOW E&M-EST. PATIENT-LVL V: CPT | Mod: PBBFAC,,, | Performed by: FAMILY MEDICINE

## 2023-05-02 PROCEDURE — 99396 PR PREVENTIVE VISIT,EST,40-64: ICD-10-PCS | Mod: S$GLB,,, | Performed by: FAMILY MEDICINE

## 2023-05-02 PROCEDURE — 3077F SYST BP >= 140 MM HG: CPT | Mod: CPTII,S$GLB,, | Performed by: FAMILY MEDICINE

## 2023-05-02 PROCEDURE — 3066F NEPHROPATHY DOC TX: CPT | Mod: CPTII,S$GLB,, | Performed by: FAMILY MEDICINE

## 2023-05-02 PROCEDURE — 99396 PREV VISIT EST AGE 40-64: CPT | Mod: S$GLB,,, | Performed by: FAMILY MEDICINE

## 2023-05-02 PROCEDURE — 3052F PR MOST RECENT HEMOGLOBIN A1C LEVEL 8.0 - < 9.0%: ICD-10-PCS | Mod: CPTII,S$GLB,, | Performed by: FAMILY MEDICINE

## 2023-05-02 PROCEDURE — 1159F MED LIST DOCD IN RCRD: CPT | Mod: CPTII,S$GLB,, | Performed by: FAMILY MEDICINE

## 2023-05-02 PROCEDURE — 3061F PR NEG MICROALBUMINURIA RESULT DOCUMENTED/REVIEW: ICD-10-PCS | Mod: CPTII,S$GLB,, | Performed by: FAMILY MEDICINE

## 2023-05-02 PROCEDURE — 1159F PR MEDICATION LIST DOCUMENTED IN MEDICAL RECORD: ICD-10-PCS | Mod: CPTII,S$GLB,, | Performed by: FAMILY MEDICINE

## 2023-05-02 PROCEDURE — 3008F BODY MASS INDEX DOCD: CPT | Mod: CPTII,S$GLB,, | Performed by: FAMILY MEDICINE

## 2023-05-02 PROCEDURE — 3077F PR MOST RECENT SYSTOLIC BLOOD PRESSURE >= 140 MM HG: ICD-10-PCS | Mod: CPTII,S$GLB,, | Performed by: FAMILY MEDICINE

## 2023-05-02 PROCEDURE — 3066F PR DOCUMENTATION OF TREATMENT FOR NEPHROPATHY: ICD-10-PCS | Mod: CPTII,S$GLB,, | Performed by: FAMILY MEDICINE

## 2023-05-02 PROCEDURE — 3079F PR MOST RECENT DIASTOLIC BLOOD PRESSURE 80-89 MM HG: ICD-10-PCS | Mod: CPTII,S$GLB,, | Performed by: FAMILY MEDICINE

## 2023-05-02 PROCEDURE — 3061F NEG MICROALBUMINURIA REV: CPT | Mod: CPTII,S$GLB,, | Performed by: FAMILY MEDICINE

## 2023-05-02 PROCEDURE — 3052F HG A1C>EQUAL 8.0%<EQUAL 9.0%: CPT | Mod: CPTII,S$GLB,, | Performed by: FAMILY MEDICINE

## 2023-05-02 RX ORDER — IBUPROFEN 100 MG/5ML
1000 SUSPENSION, ORAL (FINAL DOSE FORM) ORAL DAILY
COMMUNITY

## 2023-05-02 RX ORDER — MAGNESIUM 30 MG
TABLET ORAL ONCE
COMMUNITY

## 2023-05-02 NOTE — PROGRESS NOTES
Presents physical exam.  Diabetes not well controlled.  States compliance metformin.  States had poor diet.  Previously tried Trulicity would upset her stomach.  Blood pressure elevated as well.  Previous diagnosis hypertension however had lost weight and has been off of medication for quite a while.  She has gained some weight back.  Fatty liver asymptomatic.  Statin intolerant.    Marlene was seen today for annual exam.    Diagnoses and all orders for this visit:    Routine history and physical examination of adult  -     Hemoglobin A1C; Future  -     Mammo Digital Screening Bilat w/ Erick; Future  -     Cologuard Screening (Multitarget Stool DNA); Future  -     Cologuard Screening (Multitarget Stool DNA)  -     CBC Auto Differential; Future    Type 2 diabetes mellitus with hyperglycemia, without long-term current use of insulin  -     Hemoglobin A1C; Future  -     Ambulatory referral/consult to Optometry; Future    Hypertension associated with diabetes    Combined hyperlipidemia associated with type 2 diabetes mellitus    Fatty liver  -     CBC Auto Differential; Future    Statin intolerance    Encounter for screening mammogram for malignant neoplasm of breast  -     Mammo Digital Screening Bilat w/ Erick; Future    Screening for colon cancer  -     Cologuard Screening (Multitarget Stool DNA); Future  -     Cologuard Screening (Multitarget Stool DNA)      Recommended considering adding other diabetes medications such as Jardiance.  She declined, wanted to wait until we check her lab work again next time and she will try to watch her diet in the meantime.  She will get her blood pressure checked next week again with the nurse.  If remains elevated would need to consider blood pressure medication.  Otherwise she will recheck laboratory 3 months.          Diabetes Management Status    Statin: Not taking  ACE/ARB: Not taking    Screening or Prevention Patient's value Goal Complete/Controlled?   HgA1C Testing and  Control   Lab Results   Component Value Date    HGBA1C 8.2 (H) 04/18/2023      Annually/Less than 8% No     Lipid profile : 04/18/2023 Annually Yes     LDL control Lab Results   Component Value Date    LDLCALC 121.0 04/18/2023    Annually/Less than 100 mg/dl  No     Nephropathy screening Lab Results   Component Value Date    LABMICR 16.0 04/19/2023     Lab Results   Component Value Date    PROTEINUA Negative 03/08/2012    Annually Yes     Blood pressure BP Readings from Last 1 Encounters:   05/02/23 (!) 162/84    Less than 140/90 No     Dilated retinal exam : 05/09/2019 Annually Yes     Foot exam   : 05/02/2023 Annually Yes         Past Medical History:  Past Medical History:   Diagnosis Date    Abnormal Pap smear     ALLERGIC RHINITIS     Diabetes mellitus     Diverticulosis of colon (without mention of hemorrhage)     Fatty liver 11/17/2021    GERD (gastroesophageal reflux disease)     Heart murmur     Hyperlipidemia LDL goal < 100     Hypertension     Insomnia     Moderate cervical dysplasia      Past Surgical History:   Procedure Laterality Date    BREAST BIOPSY      Cold knife conization      COLONOSCOPY  8/9/2012         INCONTINENCE SURGERY      TVT-O    Tonsillectomy      TONSILLECTOMY       Review of patient's allergies indicates:   Allergen Reactions    Corgard  [nadolol] Other (See Comments)    Fiorinal  [butalbital-aspirin-caffeine] Other (See Comments)    Percodan [oxycodone hcl-oxycodone-asa] Other (See Comments)    Pravastatin Other (See Comments)     Elevated liver enzymes    Influenza virus vaccines Anxiety and Other (See Comments)     Current Outpatient Medications on File Prior to Visit   Medication Sig Dispense Refill    ascorbic acid, vitamin C, (VITAMIN C) 1000 MG tablet Take 1,000 mg by mouth once daily.      cetirizine (ZYRTEC) 10 MG tablet Take 1 tablet (10 mg total) by mouth once daily. 30 tablet 11    fluticasone propionate (FLONASE) 50 mcg/actuation nasal spray Instill 2 sprays (100 mcg  total) in each nostril once daily. (Patient taking differently: 2 sprays by Each Nostril route as needed.) 48 g 3    magnesium 30 mg Tab Take by mouth once.      metFORMIN (GLUCOPHAGE-XR) 500 MG ER 24hr tablet Take 4 tablets (2,000 mg total) by mouth once daily. 360 tablet 0    multivitamin capsule Take 1 capsule by mouth once daily.      RED YEAST RICE ORAL Take by mouth once daily.      ubidecarenone (COENZYME Q10) 100 mg Tab Take 1 tablet by mouth once daily.      vitamin D (VITAMIN D3) 1000 units Tab Take 1,000 Units by mouth once daily.      zinc acetate 50 mg (zinc) Cap       [DISCONTINUED] guaiFENesin-codeine 100-10 mg/5 ml (TUSSI-ORGANIDIN NR)  mg/5 mL syrup Take 5-10 ml po every 6 hours as needed for cough 118 mL 0    [DISCONTINUED] tumeric-ging-olive-oreg-capryl 100 mg-150 mg- 50 mg-150 mg Cap Take by mouth.      BLOOD SUGAR DIAGNOSTIC (BLOOD GLUCOSE TEST MISC) No Sig Provided      [DISCONTINUED] A/b-carotene/C/biofl/B6/mn/Ech (ECHINACEA C COMPLETE ORAL) Take 450 mg by mouth 2 (two) times daily.      [DISCONTINUED] aspirin (ECOTRIN) 81 MG EC tablet Take 1 tablet (81 mg total) by mouth once daily. (Patient taking differently: Take 81 mg by mouth every other day.)      [DISCONTINUED] cyanocobalamin (VITAMIN B-12) 100 MCG tablet Take 100 mcg by mouth once daily.      [DISCONTINUED] dulaglutide (TRULICITY) 0.75 mg/0.5 mL pen injector Inject 1 syringe (0.5 mLs total) into the skin every 7 days. (Patient not taking: No sig reported) 12 pen 3    [DISCONTINUED] esomeprazole (NEXIUM) 20 MG capsule Take 20 mg by mouth before breakfast.      [DISCONTINUED] milk thistle 175 mg tablet Take 250 mg by mouth once daily.      [DISCONTINUED] UNABLE TO FIND medication name: Tumeric daily       No current facility-administered medications on file prior to visit.     Social History     Socioeconomic History    Marital status:    Tobacco Use    Smoking status: Former     Packs/day: 0.00     Years: 0.00     Pack  "years: 0.00     Types: Cigarettes     Quit date: 3/8/1995     Years since quittin.1    Smokeless tobacco: Never   Substance and Sexual Activity    Alcohol use: No    Drug use: No    Sexual activity: Never     Family History   Problem Relation Age of Onset    Cancer Mother     Diabetes Mother     Hypertension Mother     Depression Brother     Skin cancer Brother     HIV Brother     Cancer Brother     Diabetes Maternal Grandmother     Stroke Maternal Grandmother     Heart disease Maternal Grandfather     Stroke Paternal Grandmother     Breast cancer Maternal Aunt     Lymphoma Brother     Diabetes Brother     Hypertension Brother     Hyperlipidemia Brother     Cancer Brother     Ovarian cancer Neg Hx            ROS:  GENERAL: No fever, chills,  or significant weight changes.   CARDIOVASCULAR: Denies chest pain, PND, orthopnea or reduced exercise tolerance.  ABDOMEN: Appetite fine. Denies diarrhea, abdominal pain, hematemesis or blood in stool.  URINARY: No flank pain, dysuria or hematuria.      OBJECTIVE:   Vitals:    23 1053 23 1055 23 1114   BP: (!) 143/89 (!) 152/76 (!) 162/84   Pulse: 89     Temp: 97.9 °F (36.6 °C)     TempSrc: Temporal     Weight: 84.6 kg (186 lb 8 oz)     Height: 5' 7" (1.702 m)       Wt Readings from Last 3 Encounters:   23 84.6 kg (186 lb 8 oz)   22 78.4 kg (172 lb 12.8 oz)   22 79.8 kg (176 lb)       APPEARANCE: Well nourished, well developed, in no acute distress.   HEAD: Normocephalic. Atraumatic. No sinus tenderness.   EYES:   Right eye: Pupil reactive. Conjunctiva clear.   Left eye: Pupil reactive. Conjunctiva clear.   Both fundi: Grossly normal to nondilated exam. EOMI.   EARS: TM's intact. Light reflex normal. No retraction or perforation.   NOSE: clear.   MOUTH & THROAT: No pharyngeal erythema or exudate. No lesions.   NECK: No bruits. No JVD. No cervical lymphadenopathy. No thyromegaly.   CHEST: Breath sounds clear bilaterally. Normal " respiratory effort   CARDIOVASCULAR: Normal rate. Regular rhythm. No murmurs. No rub. No gallops.   ABDOMEN: Bowel sounds normal. Soft. No tenderness. No organomegaly.   PERIPHERAL VASCULAR: No cyanosis. No clubbing. No edema.   NEUROLOGIC: No focal findings.    Feet:Sensation in the feet intact to monofilament testing.   No significant foot lesions.Pulses palpable.  MENTAL STATUS: Alert. Oriented x 3.

## 2023-05-16 ENCOUNTER — HOSPITAL ENCOUNTER (OUTPATIENT)
Dept: RADIOLOGY | Facility: HOSPITAL | Age: 63
Discharge: HOME | End: 2023-05-16
Attending: FAMILY MEDICINE
Payer: COMMERCIAL

## 2023-05-16 ENCOUNTER — CLINICAL SUPPORT (OUTPATIENT)
Dept: FAMILY MEDICINE | Facility: CLINIC | Age: 63
End: 2023-05-16
Payer: COMMERCIAL

## 2023-05-16 VITALS — DIASTOLIC BLOOD PRESSURE: 77 MMHG | HEART RATE: 89 BPM | SYSTOLIC BLOOD PRESSURE: 138 MMHG

## 2023-05-16 DIAGNOSIS — Z00.00 ROUTINE HISTORY AND PHYSICAL EXAMINATION OF ADULT: ICD-10-CM

## 2023-05-16 DIAGNOSIS — Z12.31 ENCOUNTER FOR SCREENING MAMMOGRAM FOR MALIGNANT NEOPLASM OF BREAST: ICD-10-CM

## 2023-05-16 DIAGNOSIS — Z01.30 BP CHECK: Primary | ICD-10-CM

## 2023-05-16 LAB
LEFT EYE DM RETINOPATHY: NEGATIVE
RIGHT EYE DM RETINOPATHY: NEGATIVE

## 2023-05-16 PROCEDURE — 99999 PR PBB SHADOW E&M-EST. PATIENT-LVL III: CPT | Mod: PBBFAC,,,

## 2023-05-16 PROCEDURE — 77063 BREAST TOMOSYNTHESIS BI: CPT | Mod: 26,,, | Performed by: RADIOLOGY

## 2023-05-16 PROCEDURE — 99999 PR PBB SHADOW E&M-EST. PATIENT-LVL III: ICD-10-PCS | Mod: PBBFAC,,,

## 2023-05-16 PROCEDURE — 77067 MAMMO DIGITAL SCREENING BILAT WITH TOMO: ICD-10-PCS | Mod: 26,,, | Performed by: RADIOLOGY

## 2023-05-16 PROCEDURE — 77067 SCR MAMMO BI INCL CAD: CPT | Mod: 26,,, | Performed by: RADIOLOGY

## 2023-05-16 PROCEDURE — 77067 SCR MAMMO BI INCL CAD: CPT | Mod: TC,PO

## 2023-05-16 PROCEDURE — 77063 MAMMO DIGITAL SCREENING BILAT WITH TOMO: ICD-10-PCS | Mod: 26,,, | Performed by: RADIOLOGY

## 2023-05-23 LAB — NONINV COLON CA DNA+OCC BLD SCRN STL QL: NORMAL

## 2023-06-11 NOTE — TELEPHONE ENCOUNTER
Refill Routing Note   Medication(s) are not appropriate for processing by [facility] for the following reason(s):      Drug-disease interaction    [facility] action(s):  Defer None identified   Medication Therapy Plan: Drug-Disease: metFORMIN and Fatty liver      Appointments  past 12m or future 3m with PCP    Date Provider   Last Visit   5/2/2023 Leonardo Pham MD   Next Visit   Visit date not found Leonardo Pham MD   ED visits in past 90 days: 0        Note composed:10:00 AM 06/11/2023

## 2023-06-11 NOTE — TELEPHONE ENCOUNTER
No care due was identified.  [facility] Embedded Care Due Messages. Reference number: [id number].   6/11/2023 5:08:13 AM CDT

## 2023-06-12 RX ORDER — METFORMIN HYDROCHLORIDE 500 MG/1
2000 TABLET, EXTENDED RELEASE ORAL DAILY
Qty: 360 TABLET | Refills: 0 | Status: SHIPPED | OUTPATIENT
Start: 2023-06-12 | End: 2023-09-09 | Stop reason: SDUPTHER

## 2023-06-14 LAB — NONINV COLON CA DNA+OCC BLD SCRN STL QL: NEGATIVE

## 2023-07-31 ENCOUNTER — PATIENT OUTREACH (OUTPATIENT)
Dept: ADMINISTRATIVE | Facility: HOSPITAL | Age: 63
End: 2023-07-31
Payer: COMMERCIAL

## 2023-07-31 NOTE — PROGRESS NOTES
Dm lab report: Per chart review, patient has a lab appointment scheduled on 8/1/23 for recheck of diabetic labs.

## 2023-08-01 ENCOUNTER — LAB VISIT (OUTPATIENT)
Dept: LAB | Facility: HOSPITAL | Age: 63
End: 2023-08-01
Attending: FAMILY MEDICINE
Payer: COMMERCIAL

## 2023-08-01 DIAGNOSIS — E11.65 TYPE 2 DIABETES MELLITUS WITH HYPERGLYCEMIA, WITHOUT LONG-TERM CURRENT USE OF INSULIN: ICD-10-CM

## 2023-08-01 DIAGNOSIS — Z00.00 ROUTINE HISTORY AND PHYSICAL EXAMINATION OF ADULT: ICD-10-CM

## 2023-08-01 DIAGNOSIS — K76.0 FATTY LIVER: ICD-10-CM

## 2023-08-01 LAB
BASOPHILS # BLD AUTO: 0.05 K/UL (ref 0–0.2)
BASOPHILS NFR BLD: 1 % (ref 0–1.9)
DIFFERENTIAL METHOD: ABNORMAL
EOSINOPHIL # BLD AUTO: 0.1 K/UL (ref 0–0.5)
EOSINOPHIL NFR BLD: 1.5 % (ref 0–8)
ERYTHROCYTE [DISTWIDTH] IN BLOOD BY AUTOMATED COUNT: 13.4 % (ref 11.5–14.5)
ESTIMATED AVG GLUCOSE: 157 MG/DL (ref 68–131)
HBA1C MFR BLD: 7.1 % (ref 4–5.6)
HCT VFR BLD AUTO: 38.1 % (ref 37–48.5)
HGB BLD-MCNC: 12.1 G/DL (ref 12–16)
IMM GRANULOCYTES # BLD AUTO: 0.01 K/UL (ref 0–0.04)
IMM GRANULOCYTES NFR BLD AUTO: 0.2 % (ref 0–0.5)
LYMPHOCYTES # BLD AUTO: 1.8 K/UL (ref 1–4.8)
LYMPHOCYTES NFR BLD: 34.9 % (ref 18–48)
MCH RBC QN AUTO: 27.6 PG (ref 27–31)
MCHC RBC AUTO-ENTMCNC: 31.8 G/DL (ref 32–36)
MCV RBC AUTO: 87 FL (ref 82–98)
MONOCYTES # BLD AUTO: 0.5 K/UL (ref 0.3–1)
MONOCYTES NFR BLD: 8.8 % (ref 4–15)
NEUTROPHILS # BLD AUTO: 2.8 K/UL (ref 1.8–7.7)
NEUTROPHILS NFR BLD: 53.6 % (ref 38–73)
NRBC BLD-RTO: 0 /100 WBC
PLATELET # BLD AUTO: 285 K/UL (ref 150–450)
PMV BLD AUTO: 11.7 FL (ref 9.2–12.9)
RBC # BLD AUTO: 4.39 M/UL (ref 4–5.4)
WBC # BLD AUTO: 5.21 K/UL (ref 3.9–12.7)

## 2023-08-01 PROCEDURE — 83036 HEMOGLOBIN GLYCOSYLATED A1C: CPT | Performed by: FAMILY MEDICINE

## 2023-08-01 PROCEDURE — 36415 COLL VENOUS BLD VENIPUNCTURE: CPT | Mod: PO | Performed by: FAMILY MEDICINE

## 2023-08-01 PROCEDURE — 85025 COMPLETE CBC W/AUTO DIFF WBC: CPT | Performed by: FAMILY MEDICINE

## 2023-08-03 ENCOUNTER — TELEPHONE (OUTPATIENT)
Dept: FAMILY MEDICINE | Facility: CLINIC | Age: 63
End: 2023-08-03
Payer: COMMERCIAL

## 2023-08-03 DIAGNOSIS — E11.65 TYPE 2 DIABETES MELLITUS WITH HYPERGLYCEMIA, WITHOUT LONG-TERM CURRENT USE OF INSULIN: Primary | ICD-10-CM

## 2023-08-03 NOTE — TELEPHONE ENCOUNTER
----- Message from Leonardo Pham MD sent at 8/2/2023  9:33 AM CDT -----   Lab looks a lot better.Schedule Vitamin B12, lipid panel, CMP, hemoglobin A1c,  urine microalbumin within 6 months of the date of this last test.  My nurse will contact you to arrange.  Thanks,  Dr. Pham

## 2023-09-09 NOTE — TELEPHONE ENCOUNTER
No care due was identified.  [facility] Embedded Care Due Messages. Reference number: [id number].   9/09/2023 5:08:11 AM CDT

## 2023-09-10 RX ORDER — METFORMIN HYDROCHLORIDE 500 MG/1
2000 TABLET, EXTENDED RELEASE ORAL DAILY
Qty: 360 TABLET | Refills: 0 | Status: SHIPPED | OUTPATIENT
Start: 2023-09-10 | End: 2023-12-08 | Stop reason: SDUPTHER

## 2023-09-10 NOTE — TELEPHONE ENCOUNTER
Refill Decision Note   Marlene Lorena  is requesting a refill authorization.  Brief Assessment and Rationale for Refill:  Approve     Medication Therapy Plan:         Comments:     Note composed:3:31 AM 09/10/2023

## 2023-12-08 RX ORDER — METFORMIN HYDROCHLORIDE 500 MG/1
2000 TABLET, EXTENDED RELEASE ORAL DAILY
Qty: 360 TABLET | Refills: 1 | Status: SHIPPED | OUTPATIENT
Start: 2023-12-08

## 2023-12-08 RX ORDER — METFORMIN HYDROCHLORIDE 500 MG/1
2000 TABLET, EXTENDED RELEASE ORAL DAILY
Qty: 360 TABLET | Refills: 0 | Status: CANCELLED | OUTPATIENT
Start: 2023-12-08

## 2023-12-08 NOTE — TELEPHONE ENCOUNTER
No care due was identified.  [facility] Embedded Care Due Messages. Reference number: [id number].   12/08/2023 12:59:11 PM CST

## 2023-12-08 NOTE — TELEPHONE ENCOUNTER
Care Due:                  Date            Visit Type   Department     Provider  --------------------------------------------------------------------------------                                EP -                              PRIMARY      [facility]  Last Visit: 05-      CARE ([facility])   MEDICINE       Leonardo Pham  Next Visit: None Scheduled  None         None Found                                                            Last  Test          Frequency    Reason                     Performed    Due Date  --------------------------------------------------------------------------------    HBA1C.......  6 months...  metFORMIN................  08- 01-    [facility] Embedded Care Due Messages. Reference number: [id number].   12/08/2023 5:08:08 AM CST

## 2023-12-15 ENCOUNTER — PATIENT OUTREACH (OUTPATIENT)
Dept: ADMINISTRATIVE | Facility: HOSPITAL | Age: 63
End: 2023-12-15
Payer: COMMERCIAL

## 2023-12-15 NOTE — LETTER
AUTHORIZATION FOR RELEASE OF   CONFIDENTIAL INFORMATION    Dear Joe Choi,    We are seeing Marlene Carrillo, date of birth 1960, in the clinic at [facility]. Leonardo Pham MD is the patient's PCP. Marlene Carrillo has an outstanding lab/procedure at the time we reviewed her chart. In order to help keep her health information updated, she has authorized us to request the following medical record(s):        (  )  MAMMOGRAM                                      (  )  COLONOSCOPY      (  )  PAP SMEAR                                          (  )  OUTSIDE LAB RESULTS     (  )  DEXA SCAN                                          (x) DIABETIC EYE EXAM            (  )  FOOT EXAM                                          (  )  ENTIRE RECORD     (  )  OUTSIDE IMMUNIZATIONS                 (  )  _______________         Please fax records to [facility], [place]ks, Gerald J., MD, [phone].     If you have any questions, please contact   Toshia ARELLANO LPN   Care Coordination   [facility]  Phone [phone]          Patient Name: Marlene Carrillo  : 1960  Patient Phone #: [phone]

## 2023-12-15 NOTE — PROGRESS NOTES
Pt reports she gets her eye exam every year at [facility], usually in the spring.  Faxed request for copy of report.

## 2024-01-31 DIAGNOSIS — Z78.0 MENOPAUSE: ICD-10-CM

## 2024-02-27 ENCOUNTER — LAB VISIT (OUTPATIENT)
Dept: LAB | Facility: HOSPITAL | Age: 64
End: 2024-02-27
Attending: FAMILY MEDICINE
Payer: COMMERCIAL

## 2024-02-27 DIAGNOSIS — E11.65 TYPE 2 DIABETES MELLITUS WITH HYPERGLYCEMIA, WITHOUT LONG-TERM CURRENT USE OF INSULIN: ICD-10-CM

## 2024-02-27 LAB
ALBUMIN SERPL BCP-MCNC: 4.3 G/DL (ref 3.5–5.2)
ALP SERPL-CCNC: 80 U/L (ref 55–135)
ALT SERPL W/O P-5'-P-CCNC: 29 U/L (ref 10–44)
ANION GAP SERPL CALC-SCNC: 11 MMOL/L (ref 8–16)
AST SERPL-CCNC: 22 U/L (ref 10–40)
BILIRUB SERPL-MCNC: 0.2 MG/DL (ref 0.1–1)
BUN SERPL-MCNC: 15 MG/DL (ref 8–23)
CALCIUM SERPL-MCNC: 10.1 MG/DL (ref 8.7–10.5)
CHLORIDE SERPL-SCNC: 105 MMOL/L (ref 95–110)
CHOLEST SERPL-MCNC: 220 MG/DL (ref 120–199)
CHOLEST/HDLC SERPL: 4.5 {RATIO} (ref 2–5)
CO2 SERPL-SCNC: 25 MMOL/L (ref 23–29)
CREAT SERPL-MCNC: 0.8 MG/DL (ref 0.5–1.4)
EST. GFR  (NO RACE VARIABLE): >60 ML/MIN/1.73 M^2
ESTIMATED AVG GLUCOSE: 169 MG/DL (ref 68–131)
GLUCOSE SERPL-MCNC: 132 MG/DL (ref 70–110)
HBA1C MFR BLD: 7.5 % (ref 4–5.6)
HDLC SERPL-MCNC: 49 MG/DL (ref 40–75)
HDLC SERPL: 22.3 % (ref 20–50)
LDLC SERPL CALC-MCNC: 145.4 MG/DL (ref 63–159)
NONHDLC SERPL-MCNC: 171 MG/DL
POTASSIUM SERPL-SCNC: 4.2 MMOL/L (ref 3.5–5.1)
PROT SERPL-MCNC: 7.5 G/DL (ref 6–8.4)
SODIUM SERPL-SCNC: 141 MMOL/L (ref 136–145)
TRIGL SERPL-MCNC: 128 MG/DL (ref 30–150)
VIT B12 SERPL-MCNC: 450 PG/ML (ref 210–950)

## 2024-02-27 PROCEDURE — 80061 LIPID PANEL: CPT | Performed by: FAMILY MEDICINE

## 2024-02-27 PROCEDURE — 82607 VITAMIN B-12: CPT | Performed by: FAMILY MEDICINE

## 2024-02-27 PROCEDURE — 80053 COMPREHEN METABOLIC PANEL: CPT | Performed by: FAMILY MEDICINE

## 2024-02-27 PROCEDURE — 83036 HEMOGLOBIN GLYCOSYLATED A1C: CPT | Performed by: FAMILY MEDICINE

## 2024-02-27 PROCEDURE — 36415 COLL VENOUS BLD VENIPUNCTURE: CPT | Mod: PO | Performed by: FAMILY MEDICINE

## 2024-03-14 ENCOUNTER — TELEPHONE (OUTPATIENT)
Dept: FAMILY MEDICINE | Facility: CLINIC | Age: 64
End: 2024-03-14
Payer: COMMERCIAL

## 2024-03-14 NOTE — TELEPHONE ENCOUNTER
----- Message from Jamin Capellan sent at 3/14/2024 11:33 AM CDT -----  Contact: brionna Rosario is calling regarding a missed call for results.  Please give her a call back at [phone]

## 2024-05-31 ENCOUNTER — LAB VISIT (OUTPATIENT)
Dept: LAB | Facility: HOSPITAL | Age: 64
End: 2024-05-31
Attending: FAMILY MEDICINE
Payer: COMMERCIAL

## 2024-05-31 DIAGNOSIS — E11.65 TYPE 2 DIABETES MELLITUS WITH HYPERGLYCEMIA, WITHOUT LONG-TERM CURRENT USE OF INSULIN: ICD-10-CM

## 2024-05-31 LAB
ESTIMATED AVG GLUCOSE: 154 MG/DL (ref 68–131)
HBA1C MFR BLD: 7 % (ref 4–5.6)

## 2024-05-31 PROCEDURE — 83036 HEMOGLOBIN GLYCOSYLATED A1C: CPT | Performed by: FAMILY MEDICINE

## 2024-05-31 PROCEDURE — 36415 COLL VENOUS BLD VENIPUNCTURE: CPT | Mod: PO | Performed by: FAMILY MEDICINE

## 2024-06-04 RX ORDER — METFORMIN HYDROCHLORIDE 500 MG/1
2000 TABLET, EXTENDED RELEASE ORAL DAILY
Qty: 360 TABLET | Refills: 0 | Status: SHIPPED | OUTPATIENT
Start: 2024-06-04

## 2024-06-04 NOTE — TELEPHONE ENCOUNTER
Refill Routing Note   Medication(s) are not appropriate for processing by [facility] for the following reason(s):        Drug-disease interaction    [facility] action(s):  Defer        Medication Therapy Plan: metFORMIN and Fatty liver    Pharmacist review requested: Yes     Appointments  past 12m or future 3m with PCP    Date Provider   Last Visit   5/2/2023 Leonardo Pham MD   Next Visit   6/10/2024 Leonardo Pham MD   ED visits in past 90 days: 0        Note composed:10:01 AM 06/04/2024

## 2024-06-04 NOTE — TELEPHONE ENCOUNTER
Marlene Carrillo  is requesting a refill authorization.  Brief Assessment and Rationale for Refill:  Approve     Medication Therapy Plan:         Pharmacist review requested: Yes   Extended chart review required: Yes   Comments:     Note composed:11:17 AM 06/04/2024

## 2024-06-04 NOTE — TELEPHONE ENCOUNTER
No care due was identified.  [facility] Embedded Care Due Messages. Reference number: [id number].   6/04/2024 5:08:07 AM CDT

## 2024-06-10 ENCOUNTER — OFFICE VISIT (OUTPATIENT)
Dept: FAMILY MEDICINE | Facility: CLINIC | Age: 64
End: 2024-06-10
Payer: COMMERCIAL

## 2024-06-10 VITALS
DIASTOLIC BLOOD PRESSURE: 86 MMHG | TEMPERATURE: 98 F | BODY MASS INDEX: 27.73 KG/M2 | HEIGHT: 67 IN | HEART RATE: 94 BPM | WEIGHT: 176.69 LBS | SYSTOLIC BLOOD PRESSURE: 156 MMHG

## 2024-06-10 DIAGNOSIS — Z12.31 ENCOUNTER FOR SCREENING MAMMOGRAM FOR MALIGNANT NEOPLASM OF BREAST: ICD-10-CM

## 2024-06-10 DIAGNOSIS — Z00.00 ROUTINE HISTORY AND PHYSICAL EXAMINATION OF ADULT: Primary | ICD-10-CM

## 2024-06-10 DIAGNOSIS — E11.69 COMBINED HYPERLIPIDEMIA ASSOCIATED WITH TYPE 2 DIABETES MELLITUS: ICD-10-CM

## 2024-06-10 DIAGNOSIS — Z78.9 STATIN INTOLERANCE: ICD-10-CM

## 2024-06-10 DIAGNOSIS — E11.59 HYPERTENSION ASSOCIATED WITH DIABETES: ICD-10-CM

## 2024-06-10 DIAGNOSIS — K76.0 FATTY LIVER: ICD-10-CM

## 2024-06-10 DIAGNOSIS — E78.2 COMBINED HYPERLIPIDEMIA ASSOCIATED WITH TYPE 2 DIABETES MELLITUS: ICD-10-CM

## 2024-06-10 DIAGNOSIS — I15.2 HYPERTENSION ASSOCIATED WITH DIABETES: ICD-10-CM

## 2024-06-10 PROCEDURE — 3079F DIAST BP 80-89 MM HG: CPT | Mod: CPTII,S$GLB,, | Performed by: FAMILY MEDICINE

## 2024-06-10 PROCEDURE — 1159F MED LIST DOCD IN RCRD: CPT | Mod: CPTII,S$GLB,, | Performed by: FAMILY MEDICINE

## 2024-06-10 PROCEDURE — 3051F HG A1C>EQUAL 7.0%<8.0%: CPT | Mod: CPTII,S$GLB,, | Performed by: FAMILY MEDICINE

## 2024-06-10 PROCEDURE — 3061F NEG MICROALBUMINURIA REV: CPT | Mod: CPTII,S$GLB,, | Performed by: FAMILY MEDICINE

## 2024-06-10 PROCEDURE — 3066F NEPHROPATHY DOC TX: CPT | Mod: CPTII,S$GLB,, | Performed by: FAMILY MEDICINE

## 2024-06-10 PROCEDURE — 99999 PR PBB SHADOW E&M-EST. PATIENT-LVL V: CPT | Mod: PBBFAC,,, | Performed by: FAMILY MEDICINE

## 2024-06-10 PROCEDURE — 1101F PT FALLS ASSESS-DOCD LE1/YR: CPT | Mod: CPTII,S$GLB,, | Performed by: FAMILY MEDICINE

## 2024-06-10 PROCEDURE — 3077F SYST BP >= 140 MM HG: CPT | Mod: CPTII,S$GLB,, | Performed by: FAMILY MEDICINE

## 2024-06-10 PROCEDURE — 3008F BODY MASS INDEX DOCD: CPT | Mod: CPTII,S$GLB,, | Performed by: FAMILY MEDICINE

## 2024-06-10 PROCEDURE — 3288F FALL RISK ASSESSMENT DOCD: CPT | Mod: CPTII,S$GLB,, | Performed by: FAMILY MEDICINE

## 2024-06-10 PROCEDURE — 99397 PER PM REEVAL EST PAT 65+ YR: CPT | Mod: S$GLB,,, | Performed by: FAMILY MEDICINE

## 2024-06-10 RX ORDER — EZETIMIBE 10 MG/1
10 TABLET ORAL DAILY
Qty: 90 TABLET | Refills: 3 | Status: SHIPPED | OUTPATIENT
Start: 2024-06-10 | End: 2025-06-10

## 2024-06-10 NOTE — PATIENT INSTRUCTIONS
Health Maintenance Due   Topic Date Due    HIV Screening  Never done    DEXA Scan  Never done    Shingles Vaccine (1 of 2) Never done    Pneumococcal Vaccines (Age 65+) (2 of 2 - PCV) 03/21/2014    RSV Vaccine (Age 60+ and Pregnant patients) (1 - 1-dose 60+ series) Never done    COVID-19 Vaccine (1 - 2023-24 season) Never done    Mammogram  05/16/2024    Eye Exam  05/16/2024

## 2024-06-10 NOTE — PROGRESS NOTES
Patient presents physical exam.  Hypertension not currently on medication.  Was on benazepril hydrochlorothiazide past but ran out blood pressure stayed down so was discontinued several years ago.  Has been okay until today's visit.  She states blood pressures at work have been 130/80 range or less.  Fatty liver asymptomatic.  She has statin intolerance with elevated liver enzyme.      Marlene was seen today for annual exam.    Diagnoses and all orders for this visit:    Routine history and physical examination of adult  -     Lipid Panel; Future  -     Comprehensive Metabolic Panel; Future  -     Hemoglobin A1C; Future  -     Mammo Digital Screening Bilat w/ Erick; Future    Hypertension associated with diabetes    Combined hyperlipidemia associated with type 2 diabetes mellitus  -     Lipid Panel; Future  -     Comprehensive Metabolic Panel; Future  -     Hemoglobin A1C; Future  -     Microalbumin/Creatinine Ratio, Urine; Future  -     Ambulatory referral/consult to Optometry; Future    Fatty liver    Statin intolerance    Encounter for screening mammogram for malignant neoplasm of breast  -     Mammo Digital Screening Bilat w/ Erick; Future    Other orders  -     ezetimibe (ZETIA) 10 mg tablet; Take 1 tablet (10 mg total) by mouth once daily.      Recheck blood pressure when she comes in mammogram.  Schedule the laboratory in November.          Diabetes Management Status    Statin: Not taking  ACE/ARB: Not taking    Screening or Prevention Patient's value Goal Complete/Controlled?   HgA1C Testing and Control   Lab Results   Component Value Date    HGBA1C 7.0 (H) 05/31/2024      Annually/Less than 8% Yes   Lipid profile : 02/27/2024 Annually Yes   LDL control Lab Results   Component Value Date    LDLCALC 145.4 02/27/2024    Annually/Less than 100 mg/dl  No   Nephropathy screening Lab Results   Component Value Date    LABMICR 10.0 02/27/2024     Lab Results   Component Value Date    PROTEINUA Negative 03/08/2012     Annually Yes   Blood pressure BP Readings from Last 1 Encounters:   06/10/24 (!) 156/86    Less than 140/90 No   Dilated retinal exam : 05/16/2023 Annually Yes   Foot exam   : 06/10/2024 Annually Yes       Past Medical History:  Past Medical History:   Diagnosis Date    Abnormal Pap smear     ALLERGIC RHINITIS     Diabetes mellitus     Diverticulosis of colon (without mention of hemorrhage)     Fatty liver 11/17/2021    GERD (gastroesophageal reflux disease)     Heart murmur     Hyperlipidemia LDL goal < 100     Hypertension     Insomnia     Moderate cervical dysplasia      Past Surgical History:   Procedure Laterality Date    BREAST BIOPSY      Cold knife conization      COLONOSCOPY  8/9/2012         INCONTINENCE SURGERY      TVT-O    Tonsillectomy      TONSILLECTOMY       Review of patient's allergies indicates:   Allergen Reactions    Corgard  [nadolol] Other (See Comments)    Fiorinal  [butalbital-aspirin-caffeine] Other (See Comments)    Percodan [oxycodone hcl-oxycodone-asa] Other (See Comments)    Pravastatin Other (See Comments)     Elevated liver enzymes    Influenza virus vaccines Anxiety and Other (See Comments)     Current Outpatient Medications on File Prior to Visit   Medication Sig Dispense Refill    ascorbic acid, vitamin C, (VITAMIN C) 1000 MG tablet Take 1,000 mg by mouth once daily.      BLOOD SUGAR DIAGNOSTIC (BLOOD GLUCOSE TEST MISC) No Sig Provided      cetirizine (ZYRTEC) 10 MG tablet Take 1 tablet (10 mg total) by mouth once daily. 30 tablet 11    fluticasone propionate (FLONASE) 50 mcg/actuation nasal spray Instill 2 sprays (100 mcg total) in each nostril once daily. (Patient taking differently: 2 sprays by Each Nostril route as needed.) 48 g 3    magnesium 30 mg Tab Take by mouth once.      metFORMIN (GLUCOPHAGE-XR) 500 MG ER 24hr tablet Take 4 tablets (2,000 mg total) by mouth once daily. 360 tablet 0    multivitamin capsule Take 1 capsule by mouth once daily.      RED YEAST RICE ORAL Take  "by mouth once daily.      ubidecarenone (COENZYME Q10) 100 mg Tab Take 1 tablet by mouth once daily.      vitamin D (VITAMIN D3) 1000 units Tab Take 1,000 Units by mouth once daily.      zinc acetate 50 mg (zinc) Cap        No current facility-administered medications on file prior to visit.     Social History     Socioeconomic History    Marital status:    Tobacco Use    Smoking status: Former     Current packs/day: 0.00     Types: Cigarettes     Quit date: 3/8/1995     Years since quittin.2    Smokeless tobacco: Never   Substance and Sexual Activity    Alcohol use: No    Drug use: No    Sexual activity: Never     Family History   Problem Relation Name Age of Onset    Cancer Mother Maria Dolores     Diabetes Mother Maria Dolores     Hypertension Mother Maria Dolores     Depression Brother Minh     Skin cancer Brother Minh     HIV Brother Minh     Cancer Brother Minh     Diabetes Maternal Grandmother Marta     Stroke Maternal Grandmother Marta     Heart disease Maternal Grandfather Michele     Stroke Paternal Grandmother Misty     Breast cancer Maternal Aunt      Lymphoma Brother Lyle     Diabetes Brother Lyle     Hypertension Brother Lyle     Hyperlipidemia Brother Lyle     Cancer Brother Lyle     Ovarian cancer Neg Hx             ROS:  GENERAL: No fever, chills,  or significant weight changes.   CARDIOVASCULAR: Denies chest pain, PND, orthopnea or reduced exercise tolerance.  ABDOMEN: Appetite fine. Denies diarrhea, abdominal pain, hematemesis or blood in stool.  URINARY: No flank pain, dysuria or hematuria.      OBJECTIVE:   Vitals:    06/10/24 0924 06/10/24 0958   BP: (!) 165/83 (!) 156/86   Pulse: 94    Temp: 97.7 °F (36.5 °C)    TempSrc: Temporal    Weight: 80.2 kg (176 lb 11.2 oz)    Height: 5' 7" (1.702 m)      Wt Readings from Last 3 Encounters:   06/10/24 80.2 kg (176 lb 11.2 oz)   23 84.6 kg (186 lb 8 oz)   22 78.4 kg (172 lb 12.8 oz)       APPEARANCE: Well nourished, well " developed, in no acute distress.   HEAD: Normocephalic. Atraumatic. No sinus tenderness.   EYES:   Right eye: Pupil reactive. Conjunctiva clear.   Left eye: Pupil reactive. Conjunctiva clear.   Both fundi: Grossly normal to nondilated exam. EOMI.   EARS: TM's intact. Light reflex normal. No retraction or perforation.   NOSE: clear.   MOUTH & THROAT: No pharyngeal erythema or exudate. No lesions.   NECK: No bruits. No JVD. No cervical lymphadenopathy. No thyromegaly.   CHEST: Breath sounds clear bilaterally. Normal respiratory effort   CARDIOVASCULAR: Normal rate. Regular rhythm. No murmurs. No rub. No gallops.   ABDOMEN: Bowel sounds normal. Soft. No tenderness. No organomegaly.   PERIPHERAL VASCULAR: No cyanosis. No clubbing. No edema.   NEUROLOGIC: No focal findings.    Feet:Sensation in the feet intact to monofilament testing.   No significant foot lesions.Pulses palpable.  MENTAL STATUS: Alert. Oriented x 3.

## 2024-06-18 LAB
LEFT EYE DM RETINOPATHY: NEGATIVE
RIGHT EYE DM RETINOPATHY: NEGATIVE

## 2024-07-09 ENCOUNTER — HOSPITAL ENCOUNTER (OUTPATIENT)
Dept: RADIOLOGY | Facility: HOSPITAL | Age: 64
Discharge: HOME | End: 2024-07-09
Attending: FAMILY MEDICINE
Payer: COMMERCIAL

## 2024-07-09 DIAGNOSIS — Z78.0 MENOPAUSE: ICD-10-CM

## 2024-07-09 DIAGNOSIS — Z12.31 ENCOUNTER FOR SCREENING MAMMOGRAM FOR MALIGNANT NEOPLASM OF BREAST: ICD-10-CM

## 2024-07-09 DIAGNOSIS — Z00.00 ROUTINE HISTORY AND PHYSICAL EXAMINATION OF ADULT: ICD-10-CM

## 2024-07-09 PROCEDURE — 77067 SCR MAMMO BI INCL CAD: CPT | Mod: 26,,, | Performed by: RADIOLOGY

## 2024-07-09 PROCEDURE — 77080 DXA BONE DENSITY AXIAL: CPT | Mod: TC,PO

## 2024-07-09 PROCEDURE — 77067 SCR MAMMO BI INCL CAD: CPT | Mod: TC,PO

## 2024-07-09 PROCEDURE — 77063 BREAST TOMOSYNTHESIS BI: CPT | Mod: 26,,, | Performed by: RADIOLOGY

## 2024-07-09 PROCEDURE — 77080 DXA BONE DENSITY AXIAL: CPT | Mod: 26,,, | Performed by: RADIOLOGY

## 2024-07-12 ENCOUNTER — TELEPHONE (OUTPATIENT)
Dept: FAMILY MEDICINE | Facility: CLINIC | Age: 64
End: 2024-07-12
Payer: COMMERCIAL

## 2024-07-12 VITALS — DIASTOLIC BLOOD PRESSURE: 76 MMHG | SYSTOLIC BLOOD PRESSURE: 125 MMHG

## 2024-07-24 ENCOUNTER — PATIENT MESSAGE (OUTPATIENT)
Dept: FAMILY MEDICINE | Facility: CLINIC | Age: 64
End: 2024-07-24
Payer: COMMERCIAL

## 2024-07-24 ENCOUNTER — TELEPHONE (OUTPATIENT)
Dept: FAMILY MEDICINE | Facility: CLINIC | Age: 64
End: 2024-07-24
Payer: COMMERCIAL

## 2024-07-25 ENCOUNTER — PATIENT OUTREACH (OUTPATIENT)
Dept: ADMINISTRATIVE | Facility: HOSPITAL | Age: 64
End: 2024-07-25
Payer: COMMERCIAL

## 2024-08-06 ENCOUNTER — LAB VISIT (OUTPATIENT)
Dept: LAB | Facility: HOSPITAL | Age: 64
End: 2024-08-06
Attending: FAMILY MEDICINE
Payer: COMMERCIAL

## 2024-08-06 ENCOUNTER — TELEPHONE (OUTPATIENT)
Dept: FAMILY MEDICINE | Facility: CLINIC | Age: 64
End: 2024-08-06
Payer: COMMERCIAL

## 2024-08-06 DIAGNOSIS — M81.0 OSTEOPOROSIS, UNSPECIFIED OSTEOPOROSIS TYPE, UNSPECIFIED PATHOLOGICAL FRACTURE PRESENCE: ICD-10-CM

## 2024-08-06 DIAGNOSIS — M81.0 OSTEOPOROSIS, UNSPECIFIED OSTEOPOROSIS TYPE, UNSPECIFIED PATHOLOGICAL FRACTURE PRESENCE: Primary | ICD-10-CM

## 2024-08-06 LAB
25(OH)D3+25(OH)D2 SERPL-MCNC: 90 NG/ML (ref 30–96)
BASOPHILS # BLD AUTO: 0.07 K/UL (ref 0–0.2)
BASOPHILS NFR BLD: 0.9 % (ref 0–1.9)
DIFFERENTIAL METHOD BLD: ABNORMAL
EOSINOPHIL # BLD AUTO: 0.1 K/UL (ref 0–0.5)
EOSINOPHIL NFR BLD: 1.5 % (ref 0–8)
ERYTHROCYTE [DISTWIDTH] IN BLOOD BY AUTOMATED COUNT: 13.6 % (ref 11.5–14.5)
HCT VFR BLD AUTO: 38.5 % (ref 37–48.5)
HGB BLD-MCNC: 12.2 G/DL (ref 12–16)
IMM GRANULOCYTES # BLD AUTO: 0.02 K/UL (ref 0–0.04)
IMM GRANULOCYTES NFR BLD AUTO: 0.3 % (ref 0–0.5)
LYMPHOCYTES # BLD AUTO: 2.4 K/UL (ref 1–4.8)
LYMPHOCYTES NFR BLD: 30.6 % (ref 18–48)
MCH RBC QN AUTO: 27.8 PG (ref 27–31)
MCHC RBC AUTO-ENTMCNC: 31.7 G/DL (ref 32–36)
MCV RBC AUTO: 88 FL (ref 82–98)
MONOCYTES # BLD AUTO: 0.6 K/UL (ref 0.3–1)
MONOCYTES NFR BLD: 7.8 % (ref 4–15)
NEUTROPHILS # BLD AUTO: 4.6 K/UL (ref 1.8–7.7)
NEUTROPHILS NFR BLD: 58.9 % (ref 38–73)
NRBC BLD-RTO: 0 /100 WBC
PLATELET # BLD AUTO: 298 K/UL (ref 150–450)
PMV BLD AUTO: 11.8 FL (ref 9.2–12.9)
RBC # BLD AUTO: 4.39 M/UL (ref 4–5.4)
WBC # BLD AUTO: 7.78 K/UL (ref 3.9–12.7)

## 2024-08-06 PROCEDURE — 36415 COLL VENOUS BLD VENIPUNCTURE: CPT | Mod: PO | Performed by: FAMILY MEDICINE

## 2024-08-06 PROCEDURE — 82306 VITAMIN D 25 HYDROXY: CPT | Performed by: FAMILY MEDICINE

## 2024-08-06 PROCEDURE — 85025 COMPLETE CBC W/AUTO DIFF WBC: CPT | Performed by: FAMILY MEDICINE

## 2024-09-02 RX ORDER — METFORMIN HYDROCHLORIDE 500 MG/1
2000 TABLET, EXTENDED RELEASE ORAL DAILY
Qty: 360 TABLET | Refills: 0 | Status: SHIPPED | OUTPATIENT
Start: 2024-09-02

## 2024-09-02 NOTE — TELEPHONE ENCOUNTER
Care Due:                  Date            Visit Type   Department     Provider  --------------------------------------------------------------------------------                                EP -                              PRIMARY      [facility] FAM[facility]  Last Visit: 06-      CARE ([facility])   MEDICINE       Leonardo Pham  Next Visit: None Scheduled  None         None Found                                                            Last  Test          Frequency    Reason                     Performed    Due Date  --------------------------------------------------------------------------------    HBA1C.......  6 months...  metFORMIN................  05- 11-    [facility] Embedded Care Due Messages. Reference number: [id number].   9/02/2024 5:08:05 AM CDT

## 2024-09-03 NOTE — TELEPHONE ENCOUNTER
Refill Decision Note   Marlene Carrillo  is requesting a refill authorization.  Brief Assessment and Rationale for Refill:  Approve     Medication Therapy Plan:  flos    Medication Reconciliation Completed: No   Comments:     No Care Gaps recommended.     Note composed:10:06 PM 09/02/2024

## 2024-09-27 NOTE — TELEPHONE ENCOUNTER
----- Message from Radha Lee sent at 7/24/2024  4:03 PM CDT -----  Type:  Patient Returning Call    Who Called:Marlene  Who Left Message for Patient:  Does the patient know what this is regarding?:  Would the patient rather a call back or a response via [facility]? Call   Best Call Back Number:[phone]  Additional Information:  
Left message on voice mail to return call and Information sent via [facility]  
[phone]

## 2024-11-29 RX ORDER — METFORMIN HYDROCHLORIDE 500 MG/1
2000 TABLET, EXTENDED RELEASE ORAL DAILY
Qty: 360 TABLET | Refills: 0 | Status: SHIPPED | OUTPATIENT
Start: 2024-11-29

## 2024-11-29 NOTE — TELEPHONE ENCOUNTER
Refill Routing Note   Medication(s) are not appropriate for processing by [facility] for the following reason(s):        Required labs outdated    [facility] action(s):  Defer   Requires labs : Yes             Appointments  past 12m or future 3m with PCP    Date Provider   Last Visit   6/10/2024 Leonardo Pham MD   Next Visit   Visit date not found Leonardo Pham MD   ED visits in past 90 days: 0        Note composed:10:41 AM 11/29/2024

## 2024-11-29 NOTE — TELEPHONE ENCOUNTER
Care Due:                  Date            Visit Type   Department     Provider  --------------------------------------------------------------------------------                                EP -                              PRIMARY      [facility] FAM[facility]  Last Visit: 06-      CARE ([facility])   MEDICINE       Leonardo Pham  Next Visit: None Scheduled  None         None Found                                                            Last  Test          Frequency    Reason                     Performed    Due Date  --------------------------------------------------------------------------------    CMP.........  12 months..  ezetimibe, metFORMIN.....  02- 02-    HBA1C.......  6 months...  metFORMIN................  05- 11-    Lipid Panel.  12 months..  ezetimibe................  02- 02-    [facility] Embedded Care Due Messages. Reference number: [id number].   11/29/2024 5:08:08 AM CST

## 2025-02-28 ENCOUNTER — PATIENT MESSAGE (OUTPATIENT)
Dept: ADMINISTRATIVE | Facility: HOSPITAL | Age: 65
End: 2025-02-28
Payer: COMMERCIAL

## 2025-02-28 RX ORDER — METFORMIN HYDROCHLORIDE 500 MG/1
2000 TABLET, EXTENDED RELEASE ORAL DAILY
Qty: 360 TABLET | Refills: 0 | Status: CANCELLED | OUTPATIENT
Start: 2025-02-28

## 2025-02-28 NOTE — TELEPHONE ENCOUNTER
Refill Routing Note   Medication(s) are not appropriate for processing by [facility] for the following reason(s):        Required labs outdated    [facility] action(s):  Defer               Appointments  past 12m or future 3m with PCP    Date Provider   Last Visit   6/10/2024 Leonardo Pham MD   Next Visit   Visit date not found Leonardo Pham MD   ED visits in past 90 days: 0        Note composed:10:02 AM 02/28/2025

## 2025-03-03 RX ORDER — METFORMIN HYDROCHLORIDE 500 MG/1
2000 TABLET, EXTENDED RELEASE ORAL DAILY
Qty: 360 TABLET | Refills: 0 | Status: SHIPPED | OUTPATIENT
Start: 2025-03-03

## 2025-05-29 ENCOUNTER — PATIENT MESSAGE (OUTPATIENT)
Dept: FAMILY MEDICINE | Facility: CLINIC | Age: 65
End: 2025-05-29
Payer: COMMERCIAL

## 2025-05-29 RX ORDER — EZETIMIBE 10 MG/1
10 TABLET ORAL DAILY
Qty: 90 TABLET | Refills: 3 | OUTPATIENT
Start: 2025-05-29 | End: 2026-05-29

## 2025-05-29 NOTE — TELEPHONE ENCOUNTER
Care Due:                  Date            Visit Type   Department     Provider  --------------------------------------------------------------------------------                                EP -                              PRIMARY      [facility] FAMILY  Last Visit: 06-      CARE ([facility])   MEDICINE       Leonardo Pham  Next Visit: None Scheduled  None         None Found                                                            Last  Test          Frequency    Reason                     Performed    Due Date  --------------------------------------------------------------------------------    CMP.........  12 months..  ezetimibe................  02- 02-    Lipid Panel.  12 months..  ezetimibe................  02- 02-    Health [facility] Embedded Care Due Messages. Reference number: [id number].   5/29/2025 5:08:11 AM CDT

## 2025-05-30 ENCOUNTER — PATIENT MESSAGE (OUTPATIENT)
Dept: ADMINISTRATIVE | Facility: HOSPITAL | Age: 65
End: 2025-05-30
Payer: COMMERCIAL

## 2025-05-30 RX ORDER — METFORMIN HYDROCHLORIDE 500 MG/1
2000 TABLET, EXTENDED RELEASE ORAL DAILY
Qty: 360 TABLET | Refills: 0 | Status: SHIPPED | OUTPATIENT
Start: 2025-05-30

## 2025-05-30 NOTE — TELEPHONE ENCOUNTER
No care due was identified.  Health [facility] Embedded Care Due Messages. Reference number: [id number].   5/30/2025 1:37:36 PM CDT

## 2025-05-30 NOTE — TELEPHONE ENCOUNTER
Refill Routing Note   Medication(s) are not appropriate for processing by [facility] for the following reason(s):        Required labs outdated    [facility] action(s):  Defer               Appointments  past 12m or future 3m with PCP    Date Provider   Last Visit   6/10/2024 Leonardo Pham MD   Next Visit   Visit date not found Leonardo Pham MD   ED visits in past 90 days: 0        Note composed:1:38 PM 05/30/2025

## 2025-06-23 ENCOUNTER — PATIENT OUTREACH (OUTPATIENT)
Dept: ADMINISTRATIVE | Facility: HOSPITAL | Age: 65
End: 2025-06-23
Payer: COMMERCIAL

## 2025-07-24 ENCOUNTER — PATIENT OUTREACH (OUTPATIENT)
Dept: ADMINISTRATIVE | Facility: HOSPITAL | Age: 65
End: 2025-07-24
Payer: COMMERCIAL

## 2025-07-24 NOTE — PROGRESS NOTES
CC VB Report: Called Pt to schedule annual & overdue HM topics. No answer, LVM.     VBHM Score: 7      Urine Screening  Eye Exam  Hemoglobin A1c  Lipid Panel  Mammogram  Foot Exam  Uncontrolled BP     Pneumonia Vaccine  Shingles/Zoster Vaccine  RSV Vaccine

## 2025-08-28 RX ORDER — METFORMIN HYDROCHLORIDE 500 MG/1
2000 TABLET, EXTENDED RELEASE ORAL DAILY
Qty: 120 TABLET | Refills: 0 | Status: SHIPPED | OUTPATIENT
Start: 2025-08-28